# Patient Record
Sex: MALE | Race: WHITE | NOT HISPANIC OR LATINO | Employment: OTHER | URBAN - METROPOLITAN AREA
[De-identification: names, ages, dates, MRNs, and addresses within clinical notes are randomized per-mention and may not be internally consistent; named-entity substitution may affect disease eponyms.]

---

## 2018-01-01 ENCOUNTER — APPOINTMENT (OUTPATIENT)
Dept: PREADMISSION TESTING | Facility: HOSPITAL | Age: 68
End: 2018-01-01

## 2018-01-01 ENCOUNTER — HOSPITAL ENCOUNTER (OUTPATIENT)
Facility: HOSPITAL | Age: 68
Setting detail: HOSPITAL OUTPATIENT SURGERY
Discharge: HOME | End: 2018-06-27
Attending: INTERNAL MEDICINE | Admitting: INTERNAL MEDICINE

## 2018-01-01 ENCOUNTER — APPOINTMENT (OUTPATIENT)
Dept: CARDIOLOGY | Facility: HOSPITAL | Age: 68
End: 2018-01-01
Attending: INTERNAL MEDICINE

## 2018-01-01 ENCOUNTER — PREP FOR SURGERY (OUTPATIENT)
Dept: OTHER | Facility: HOSPITAL | Age: 68
End: 2018-01-01

## 2018-01-01 ENCOUNTER — HOSPITAL ENCOUNTER (OUTPATIENT)
Facility: HOSPITAL | Age: 68
Discharge: HOME | End: 2018-08-22
Attending: INTERNAL MEDICINE | Admitting: INTERNAL MEDICINE

## 2018-01-01 ENCOUNTER — DOCUMENTATION (OUTPATIENT)
Dept: CARDIOLOGY | Facility: CLINIC | Age: 68
End: 2018-01-01

## 2018-01-01 ENCOUNTER — HOSPITAL ENCOUNTER (OUTPATIENT)
Dept: CARDIOLOGY | Facility: HOSPITAL | Age: 68
Discharge: HOME | End: 2018-05-02
Attending: INTERNAL MEDICINE | Admitting: INTERNAL MEDICINE

## 2018-01-01 ENCOUNTER — ANESTHESIA EVENT (OUTPATIENT)
Dept: CARDIOLOGY | Facility: HOSPITAL | Age: 68
End: 2018-01-01

## 2018-01-01 ENCOUNTER — ANESTHESIA (OUTPATIENT)
Dept: CARDIOLOGY | Facility: HOSPITAL | Age: 68
End: 2018-01-01

## 2018-01-01 ENCOUNTER — TELEPHONE (OUTPATIENT)
Dept: CARDIOLOGY | Facility: CLINIC | Age: 68
End: 2018-01-01

## 2018-01-01 ENCOUNTER — APPOINTMENT (OUTPATIENT)
Dept: GENERAL RADIOLOGY | Facility: HOSPITAL | Age: 68
End: 2018-01-01

## 2018-01-01 ENCOUNTER — OFFICE VISIT (OUTPATIENT)
Dept: CARDIOLOGY | Facility: CLINIC | Age: 68
End: 2018-01-01

## 2018-01-01 ENCOUNTER — HOSPITAL ENCOUNTER (INPATIENT)
Facility: HOSPITAL | Age: 68
LOS: 2 days | Discharge: HOME | End: 2018-06-14
Attending: INTERNAL MEDICINE | Admitting: INTERNAL MEDICINE

## 2018-01-01 VITALS
HEIGHT: 71 IN | HEART RATE: 92 BPM | DIASTOLIC BLOOD PRESSURE: 68 MMHG | WEIGHT: 179.31 LBS | SYSTOLIC BLOOD PRESSURE: 116 MMHG | OXYGEN SATURATION: 94 % | RESPIRATION RATE: 16 BRPM | BODY MASS INDEX: 25.1 KG/M2

## 2018-01-01 VITALS
WEIGHT: 180.8 LBS | HEIGHT: 71 IN | SYSTOLIC BLOOD PRESSURE: 130 MMHG | BODY MASS INDEX: 25.31 KG/M2 | DIASTOLIC BLOOD PRESSURE: 74 MMHG | HEART RATE: 77 BPM

## 2018-01-01 VITALS
DIASTOLIC BLOOD PRESSURE: 64 MMHG | RESPIRATION RATE: 18 BRPM | HEART RATE: 65 BPM | TEMPERATURE: 97.5 F | SYSTOLIC BLOOD PRESSURE: 133 MMHG | WEIGHT: 175.49 LBS | OXYGEN SATURATION: 93 % | BODY MASS INDEX: 24.57 KG/M2 | HEIGHT: 71 IN

## 2018-01-01 VITALS
HEART RATE: 92 BPM | BODY MASS INDEX: 25.64 KG/M2 | DIASTOLIC BLOOD PRESSURE: 67 MMHG | OXYGEN SATURATION: 79 % | RESPIRATION RATE: 16 BRPM | HEIGHT: 71 IN | TEMPERATURE: 97.9 F | WEIGHT: 183.13 LBS | SYSTOLIC BLOOD PRESSURE: 110 MMHG

## 2018-01-01 VITALS
DIASTOLIC BLOOD PRESSURE: 70 MMHG | OXYGEN SATURATION: 97 % | TEMPERATURE: 97.9 F | WEIGHT: 181.44 LBS | RESPIRATION RATE: 14 BRPM | SYSTOLIC BLOOD PRESSURE: 110 MMHG | BODY MASS INDEX: 25.4 KG/M2 | HEART RATE: 101 BPM | HEIGHT: 71 IN

## 2018-01-01 DIAGNOSIS — I48.19 PERSISTENT ATRIAL FIBRILLATION (HCC): Primary | ICD-10-CM

## 2018-01-01 DIAGNOSIS — I48.91 ATRIAL FIBRILLATION, UNSPECIFIED TYPE (HCC): ICD-10-CM

## 2018-01-01 DIAGNOSIS — I48.91 ATRIAL FIBRILLATION, UNSPECIFIED TYPE (HCC): Primary | ICD-10-CM

## 2018-01-01 DIAGNOSIS — I10 ESSENTIAL HYPERTENSION: Primary | ICD-10-CM

## 2018-01-01 DIAGNOSIS — I48.19 PERSISTENT ATRIAL FIBRILLATION (HCC): ICD-10-CM

## 2018-01-01 DIAGNOSIS — I48.91 A-FIB (HCC): ICD-10-CM

## 2018-01-01 DIAGNOSIS — I48.91 A-FIB (HCC): Primary | ICD-10-CM

## 2018-01-01 LAB
ANION GAP SERPL CALCULATED.3IONS-SCNC: 5 MMOL/L (ref 3–11)
ANION GAP SERPL CALCULATED.3IONS-SCNC: 6 MMOL/L (ref 3–11)
ANION GAP SERPL CALCULATED.3IONS-SCNC: 7 MMOL/L (ref 3–11)
ANION GAP SERPL CALCULATED.3IONS-SCNC: 8 MMOL/L (ref 3–11)
APTT PPP: 32.9 SECONDS (ref 24–31)
BUN BLD-MCNC: 12 MG/DL (ref 9–23)
BUN BLD-MCNC: 13 MG/DL (ref 9–23)
BUN BLD-MCNC: 18 MG/DL (ref 9–23)
BUN/CREAT SERPL: 14.3 (ref 7–25)
BUN/CREAT SERPL: 14.9 (ref 7–25)
BUN/CREAT SERPL: 15 (ref 7–25)
BUN/CREAT SERPL: 16.4 (ref 7–25)
BUN/CREAT SERPL: 17.9 (ref 7–25)
BUN/CREAT SERPL: 20.5 (ref 7–25)
CA-I SERPL ISE-MCNC: 1.33 MMOL/L (ref 1.12–1.32)
CALCIUM SPEC-SCNC: 8.7 MG/DL (ref 8.7–10.4)
CALCIUM SPEC-SCNC: 9.1 MG/DL (ref 8.7–10.4)
CALCIUM SPEC-SCNC: 9.3 MG/DL (ref 8.7–10.4)
CALCIUM SPEC-SCNC: 9.4 MG/DL (ref 8.7–10.4)
CALCIUM SPEC-SCNC: 9.5 MG/DL (ref 8.7–10.4)
CALCIUM SPEC-SCNC: 9.6 MG/DL (ref 8.7–10.4)
CHLORIDE SERPL-SCNC: 100 MMOL/L (ref 99–109)
CHLORIDE SERPL-SCNC: 103 MMOL/L (ref 99–109)
CO2 SERPL-SCNC: 31 MMOL/L (ref 20–31)
CO2 SERPL-SCNC: 32 MMOL/L (ref 20–31)
CO2 SERPL-SCNC: 32 MMOL/L (ref 20–31)
CO2 SERPL-SCNC: 33 MMOL/L (ref 20–31)
CO2 SERPL-SCNC: 33 MMOL/L (ref 20–31)
CO2 SERPL-SCNC: 34 MMOL/L (ref 20–31)
CREAT BLD-MCNC: 0.67 MG/DL (ref 0.6–1.3)
CREAT BLD-MCNC: 0.73 MG/DL (ref 0.6–1.3)
CREAT BLD-MCNC: 0.8 MG/DL (ref 0.6–1.3)
CREAT BLD-MCNC: 0.84 MG/DL (ref 0.6–1.3)
CREAT BLD-MCNC: 0.87 MG/DL (ref 0.6–1.3)
CREAT BLD-MCNC: 0.88 MG/DL (ref 0.6–1.3)
DEPRECATED RDW RBC AUTO: 45.8 FL (ref 37–54)
DEPRECATED RDW RBC AUTO: 49.2 FL (ref 37–54)
DEPRECATED RDW RBC AUTO: 49.6 FL (ref 37–54)
ERYTHROCYTE [DISTWIDTH] IN BLOOD BY AUTOMATED COUNT: 14.5 % (ref 11.3–14.5)
ERYTHROCYTE [DISTWIDTH] IN BLOOD BY AUTOMATED COUNT: 15.6 % (ref 11.3–14.5)
ERYTHROCYTE [DISTWIDTH] IN BLOOD BY AUTOMATED COUNT: 16.3 % (ref 11.3–14.5)
GFR SERPL CREATININE-BSD FRML MDRD: 107 ML/MIN/1.73
GFR SERPL CREATININE-BSD FRML MDRD: 118 ML/MIN/1.73
GFR SERPL CREATININE-BSD FRML MDRD: 86 ML/MIN/1.73
GFR SERPL CREATININE-BSD FRML MDRD: 87 ML/MIN/1.73
GFR SERPL CREATININE-BSD FRML MDRD: 91 ML/MIN/1.73
GFR SERPL CREATININE-BSD FRML MDRD: 96 ML/MIN/1.73
GLUCOSE BLD-MCNC: 114 MG/DL (ref 70–100)
GLUCOSE BLD-MCNC: 114 MG/DL (ref 70–100)
GLUCOSE BLD-MCNC: 119 MG/DL (ref 70–100)
GLUCOSE BLD-MCNC: 131 MG/DL (ref 70–100)
GLUCOSE BLD-MCNC: 134 MG/DL (ref 70–100)
GLUCOSE BLD-MCNC: 149 MG/DL (ref 70–100)
GLUCOSE BLDC GLUCOMTR-MCNC: 111 MG/DL (ref 70–130)
GLUCOSE BLDC GLUCOMTR-MCNC: 133 MG/DL (ref 70–130)
GLUCOSE BLDC GLUCOMTR-MCNC: 158 MG/DL (ref 70–130)
GLUCOSE BLDC GLUCOMTR-MCNC: 206 MG/DL (ref 70–130)
HBA1C MFR BLD: 6.7 % (ref 4.8–5.6)
HBA1C MFR BLD: 6.9 % (ref 4.8–5.6)
HCT VFR BLD AUTO: 37.5 % (ref 38.9–50.9)
HCT VFR BLD AUTO: 37.7 % (ref 38.9–50.9)
HCT VFR BLD AUTO: 38.4 % (ref 38.9–50.9)
HGB BLD-MCNC: 11.2 G/DL (ref 13.1–17.5)
HGB BLD-MCNC: 11.6 G/DL (ref 13.1–17.5)
HGB BLD-MCNC: 12.3 G/DL (ref 13.1–17.5)
INR PPP: 1.17 (ref 0.91–1.09)
INR PPP: 1.25 (ref 0.91–1.09)
INR PPP: 1.25 (ref 0.91–1.09)
MAGNESIUM SERPL-MCNC: 1.7 MG/DL (ref 1.3–2.7)
MAGNESIUM SERPL-MCNC: 1.8 MG/DL (ref 1.3–2.7)
MAGNESIUM SERPL-MCNC: 2.1 MG/DL (ref 1.3–2.7)
MAGNESIUM SERPL-MCNC: 2.3 MG/DL (ref 1.3–2.7)
MAGNESIUM SERPL-MCNC: 2.8 MG/DL (ref 1.3–2.7)
MCH RBC QN AUTO: 24.9 PG (ref 27–31)
MCH RBC QN AUTO: 26.5 PG (ref 27–31)
MCH RBC QN AUTO: 27.8 PG (ref 27–31)
MCHC RBC AUTO-ENTMCNC: 29.9 G/DL (ref 32–36)
MCHC RBC AUTO-ENTMCNC: 30.8 G/DL (ref 32–36)
MCHC RBC AUTO-ENTMCNC: 32 G/DL (ref 32–36)
MCV RBC AUTO: 83.3 FL (ref 80–99)
MCV RBC AUTO: 86.3 FL (ref 80–99)
MCV RBC AUTO: 86.9 FL (ref 80–99)
PLATELET # BLD AUTO: 169 10*3/MM3 (ref 150–450)
PLATELET # BLD AUTO: 189 10*3/MM3 (ref 150–450)
PLATELET # BLD AUTO: 208 10*3/MM3 (ref 150–450)
PMV BLD AUTO: 8.9 FL (ref 6–12)
PMV BLD AUTO: 9.1 FL (ref 6–12)
PMV BLD AUTO: 9.1 FL (ref 6–12)
POTASSIUM BLD-SCNC: 3.8 MMOL/L (ref 3.5–5.5)
POTASSIUM BLD-SCNC: 4.1 MMOL/L (ref 3.5–5.5)
POTASSIUM BLD-SCNC: 4.2 MMOL/L (ref 3.5–5.5)
POTASSIUM BLD-SCNC: 4.3 MMOL/L (ref 3.5–5.5)
POTASSIUM BLD-SCNC: 4.4 MMOL/L (ref 3.5–5.5)
POTASSIUM BLD-SCNC: 4.5 MMOL/L (ref 3.5–5.5)
PROTHROMBIN TIME: 12.3 SECONDS (ref 9.6–11.5)
PROTHROMBIN TIME: 13.1 SECONDS (ref 9.6–11.5)
PROTHROMBIN TIME: 13.1 SECONDS (ref 9.6–11.5)
RBC # BLD AUTO: 4.37 10*6/MM3 (ref 4.2–5.76)
RBC # BLD AUTO: 4.42 10*6/MM3 (ref 4.2–5.76)
RBC # BLD AUTO: 4.5 10*6/MM3 (ref 4.2–5.76)
SODIUM BLD-SCNC: 140 MMOL/L (ref 132–146)
SODIUM BLD-SCNC: 141 MMOL/L (ref 132–146)
SODIUM BLD-SCNC: 141 MMOL/L (ref 132–146)
SODIUM BLD-SCNC: 144 MMOL/L (ref 132–146)
WBC NRBC COR # BLD: 8.01 10*3/MM3 (ref 3.5–10.8)
WBC NRBC COR # BLD: 9.37 10*3/MM3 (ref 3.5–10.8)
WBC NRBC COR # BLD: 9.49 10*3/MM3 (ref 3.5–10.8)

## 2018-01-01 PROCEDURE — 83036 HEMOGLOBIN GLYCOSYLATED A1C: CPT | Performed by: NURSE PRACTITIONER

## 2018-01-01 PROCEDURE — A9270 NON-COVERED ITEM OR SERVICE: HCPCS | Performed by: NURSE ANESTHETIST, CERTIFIED REGISTERED

## 2018-01-01 PROCEDURE — 94640 AIRWAY INHALATION TREATMENT: CPT

## 2018-01-01 PROCEDURE — 93650 ICAR CATH ABLTJ AV NODE FUNC: CPT | Performed by: INTERNAL MEDICINE

## 2018-01-01 PROCEDURE — 93005 ELECTROCARDIOGRAM TRACING: CPT | Performed by: INTERNAL MEDICINE

## 2018-01-01 PROCEDURE — 92960 CARDIOVERSION ELECTRIC EXT: CPT

## 2018-01-01 PROCEDURE — C1887 CATHETER, GUIDING: HCPCS | Performed by: INTERNAL MEDICINE

## 2018-01-01 PROCEDURE — 80048 BASIC METABOLIC PNL TOTAL CA: CPT | Performed by: NURSE PRACTITIONER

## 2018-01-01 PROCEDURE — A9270 NON-COVERED ITEM OR SERVICE: HCPCS | Performed by: NURSE PRACTITIONER

## 2018-01-01 PROCEDURE — C2621 PMKR, OTHER THAN SING/DUAL: HCPCS | Performed by: INTERNAL MEDICINE

## 2018-01-01 PROCEDURE — 63710000001 BUDESONIDE-FORMOTEROL 160-4.5 MCG/ACT AEROSOL 6 G INHALER: Performed by: NURSE PRACTITIONER

## 2018-01-01 PROCEDURE — 25010000002 MIDAZOLAM PER 1 MG: Performed by: INTERNAL MEDICINE

## 2018-01-01 PROCEDURE — 25010000003 CEFAZOLIN IN DEXTROSE 2-4 GM/100ML-% SOLUTION: Performed by: NURSE PRACTITIONER

## 2018-01-01 PROCEDURE — 99024 POSTOP FOLLOW-UP VISIT: CPT | Performed by: INTERNAL MEDICINE

## 2018-01-01 PROCEDURE — C1733 CATH, EP, OTHR THAN COOL-TIP: HCPCS | Performed by: INTERNAL MEDICINE

## 2018-01-01 PROCEDURE — 63710000001 PANTOPRAZOLE 40 MG TABLET DELAYED-RELEASE: Performed by: NURSE PRACTITIONER

## 2018-01-01 PROCEDURE — 5A2204Z RESTORATION OF CARDIAC RHYTHM, SINGLE: ICD-10-PCS | Performed by: INTERNAL MEDICINE

## 2018-01-01 PROCEDURE — 63710000001 ALBUTEROL 108 (90 BASE) MCG/ACT AEROSOL SOLUTION 8 G INHALER: Performed by: NURSE ANESTHETIST, CERTIFIED REGISTERED

## 2018-01-01 PROCEDURE — 94799 UNLISTED PULMONARY SVC/PX: CPT

## 2018-01-01 PROCEDURE — 92960 CARDIOVERSION ELECTRIC EXT: CPT | Performed by: INTERNAL MEDICINE

## 2018-01-01 PROCEDURE — C1900 LEAD, CORONARY VENOUS: HCPCS | Performed by: INTERNAL MEDICINE

## 2018-01-01 PROCEDURE — 94760 N-INVAS EAR/PLS OXIMETRY 1: CPT

## 2018-01-01 PROCEDURE — 33249 INSJ/RPLCMT DEFIB W/LEAD(S): CPT | Performed by: INTERNAL MEDICINE

## 2018-01-01 PROCEDURE — A9270 NON-COVERED ITEM OR SERVICE: HCPCS | Performed by: INTERNAL MEDICINE

## 2018-01-01 PROCEDURE — 63710000001 ASPIRIN 81 MG TABLET DELAYED-RELEASE: Performed by: NURSE PRACTITIONER

## 2018-01-01 PROCEDURE — 63710000001 ATORVASTATIN 10 MG TABLET: Performed by: NURSE PRACTITIONER

## 2018-01-01 PROCEDURE — 25010000002 PHENYLEPHRINE PER 1 ML: Performed by: NURSE ANESTHETIST, CERTIFIED REGISTERED

## 2018-01-01 PROCEDURE — 83735 ASSAY OF MAGNESIUM: CPT

## 2018-01-01 PROCEDURE — 99024 POSTOP FOLLOW-UP VISIT: CPT | Performed by: NURSE PRACTITIONER

## 2018-01-01 PROCEDURE — C1892 INTRO/SHEATH,FIXED,PEEL-AWAY: HCPCS | Performed by: INTERNAL MEDICINE

## 2018-01-01 PROCEDURE — 93010 ELECTROCARDIOGRAM REPORT: CPT | Performed by: INTERNAL MEDICINE

## 2018-01-01 PROCEDURE — 63710000001 TAMSULOSIN 0.4 MG CAPSULE: Performed by: NURSE PRACTITIONER

## 2018-01-01 PROCEDURE — C1769 GUIDE WIRE: HCPCS | Performed by: INTERNAL MEDICINE

## 2018-01-01 PROCEDURE — 25010000002 FENTANYL CITRATE (PF) 100 MCG/2ML SOLUTION: Performed by: INTERNAL MEDICINE

## 2018-01-01 PROCEDURE — 63710000001 LEVOTHYROXINE 75 MCG TABLET: Performed by: NURSE PRACTITIONER

## 2018-01-01 PROCEDURE — 93000 ELECTROCARDIOGRAM COMPLETE: CPT | Performed by: NURSE PRACTITIONER

## 2018-01-01 PROCEDURE — 36415 COLL VENOUS BLD VENIPUNCTURE: CPT

## 2018-01-01 PROCEDURE — 85027 COMPLETE CBC AUTOMATED: CPT | Performed by: NURSE PRACTITIONER

## 2018-01-01 PROCEDURE — 25010000002 ONDANSETRON PER 1 MG: Performed by: NURSE ANESTHETIST, CERTIFIED REGISTERED

## 2018-01-01 PROCEDURE — 25010000002 ONDANSETRON PER 1 MG: Performed by: INTERNAL MEDICINE

## 2018-01-01 PROCEDURE — 93005 ELECTROCARDIOGRAM TRACING: CPT | Performed by: NURSE PRACTITIONER

## 2018-01-01 PROCEDURE — 33208 INSRT HEART PM ATRIAL & VENT: CPT

## 2018-01-01 PROCEDURE — 63710000001 FINASTERIDE 5 MG TABLET: Performed by: NURSE PRACTITIONER

## 2018-01-01 PROCEDURE — 25010000002 DEXAMETHASONE PER 1 MG: Performed by: NURSE ANESTHETIST, CERTIFIED REGISTERED

## 2018-01-01 PROCEDURE — 82962 GLUCOSE BLOOD TEST: CPT

## 2018-01-01 PROCEDURE — 82330 ASSAY OF CALCIUM: CPT | Performed by: NURSE PRACTITIONER

## 2018-01-01 PROCEDURE — 25010000002 NEOSTIGMINE 10 MG/10ML SOLUTION: Performed by: NURSE ANESTHETIST, CERTIFIED REGISTERED

## 2018-01-01 PROCEDURE — 80048 BASIC METABOLIC PNL TOTAL CA: CPT

## 2018-01-01 PROCEDURE — 25010000003 CEFAZOLIN IN DEXTROSE 2-4 GM/100ML-% SOLUTION: Performed by: INTERNAL MEDICINE

## 2018-01-01 PROCEDURE — 33225 L VENTRIC PACING LEAD ADD-ON: CPT | Performed by: INTERNAL MEDICINE

## 2018-01-01 PROCEDURE — 83735 ASSAY OF MAGNESIUM: CPT | Performed by: NURSE PRACTITIONER

## 2018-01-01 PROCEDURE — 63710000001 OXYCODONE-ACETAMINOPHEN 5-325 MG TABLET: Performed by: INTERNAL MEDICINE

## 2018-01-01 PROCEDURE — C1898 LEAD, PMKR, OTHER THAN TRANS: HCPCS | Performed by: INTERNAL MEDICINE

## 2018-01-01 PROCEDURE — 85027 COMPLETE CBC AUTOMATED: CPT | Performed by: INTERNAL MEDICINE

## 2018-01-01 PROCEDURE — 85610 PROTHROMBIN TIME: CPT | Performed by: NURSE PRACTITIONER

## 2018-01-01 PROCEDURE — 71046 X-RAY EXAM CHEST 2 VIEWS: CPT

## 2018-01-01 PROCEDURE — C1730 CATH, EP, 19 OR FEW ELECT: HCPCS | Performed by: INTERNAL MEDICINE

## 2018-01-01 PROCEDURE — 80048 BASIC METABOLIC PNL TOTAL CA: CPT | Performed by: INTERNAL MEDICINE

## 2018-01-01 PROCEDURE — C1894 INTRO/SHEATH, NON-LASER: HCPCS | Performed by: INTERNAL MEDICINE

## 2018-01-01 PROCEDURE — 63710000001 INSULIN LISPRO (HUMAN) PER 5 UNITS: Performed by: NURSE ANESTHETIST, CERTIFIED REGISTERED

## 2018-01-01 PROCEDURE — 25010000002 PROPOFOL 10 MG/ML EMULSION: Performed by: NURSE ANESTHETIST, CERTIFIED REGISTERED

## 2018-01-01 PROCEDURE — 63710000001 FUROSEMIDE 20 MG TABLET: Performed by: NURSE PRACTITIONER

## 2018-01-01 PROCEDURE — 99232 SBSQ HOSP IP/OBS MODERATE 35: CPT | Performed by: INTERNAL MEDICINE

## 2018-01-01 PROCEDURE — 83735 ASSAY OF MAGNESIUM: CPT | Performed by: INTERNAL MEDICINE

## 2018-01-01 PROCEDURE — 99222 1ST HOSP IP/OBS MODERATE 55: CPT | Performed by: INTERNAL MEDICINE

## 2018-01-01 PROCEDURE — 85730 THROMBOPLASTIN TIME PARTIAL: CPT | Performed by: NURSE PRACTITIONER

## 2018-01-01 PROCEDURE — 99204 OFFICE O/P NEW MOD 45 MIN: CPT | Performed by: INTERNAL MEDICINE

## 2018-01-01 PROCEDURE — 0 IOPAMIDOL PER 1 ML: Performed by: INTERNAL MEDICINE

## 2018-01-01 PROCEDURE — 63710000001 MONTELUKAST 10 MG TABLET: Performed by: NURSE PRACTITIONER

## 2018-01-01 DEVICE — LD PM TENDRIL STS 6F52CM 2088TC52: Type: IMPLANTABLE DEVICE | Status: FUNCTIONAL

## 2018-01-01 DEVICE — LD QUARTET LV S/CRV BIPOL 1458Q/92: Type: IMPLANTABLE DEVICE | Status: FUNCTIONAL

## 2018-01-01 DEVICE — LD PM TENDRIL STS 6F58CM 2088TC58: Type: IMPLANTABLE DEVICE | Status: FUNCTIONAL

## 2018-01-01 DEVICE — GEN PM ALLURE QUADRA BIVENT RF CRTP PM3262: Type: IMPLANTABLE DEVICE | Status: FUNCTIONAL

## 2018-01-01 RX ORDER — FLUMAZENIL 0.1 MG/ML
INJECTION INTRAVENOUS
Status: DISCONTINUED
Start: 2018-01-01 | End: 2018-01-01 | Stop reason: WASHOUT

## 2018-01-01 RX ORDER — LEVOTHYROXINE SODIUM 0.05 MG/1
50 TABLET ORAL DAILY
COMMUNITY
End: 2018-01-01

## 2018-01-01 RX ORDER — ACETAMINOPHEN 325 MG/1
650 TABLET ORAL EVERY 4 HOURS PRN
Status: DISCONTINUED | OUTPATIENT
Start: 2018-01-01 | End: 2018-01-01 | Stop reason: HOSPADM

## 2018-01-01 RX ORDER — ACETAMINOPHEN 325 MG/1
650 TABLET ORAL EVERY 4 HOURS PRN
Status: CANCELLED | OUTPATIENT
Start: 2018-01-01

## 2018-01-01 RX ORDER — MIDAZOLAM HYDROCHLORIDE 1 MG/ML
INJECTION INTRAMUSCULAR; INTRAVENOUS
Status: DISCONTINUED
Start: 2018-01-01 | End: 2018-01-01 | Stop reason: HOSPADM

## 2018-01-01 RX ORDER — PROMETHAZINE HYDROCHLORIDE 25 MG/ML
12.5 INJECTION, SOLUTION INTRAMUSCULAR; INTRAVENOUS EVERY 4 HOURS PRN
Status: CANCELLED | OUTPATIENT
Start: 2018-01-01

## 2018-01-01 RX ORDER — DIGOXIN 250 MCG
250 TABLET ORAL
COMMUNITY
End: 2018-01-01 | Stop reason: HOSPADM

## 2018-01-01 RX ORDER — PANTOPRAZOLE SODIUM 40 MG/1
40 TABLET, DELAYED RELEASE ORAL EVERY MORNING
Status: DISCONTINUED | OUTPATIENT
Start: 2018-01-01 | End: 2018-01-01 | Stop reason: HOSPADM

## 2018-01-01 RX ORDER — ASPIRIN 81 MG/1
81 TABLET ORAL NIGHTLY
Status: DISCONTINUED | OUTPATIENT
Start: 2018-01-01 | End: 2018-01-01 | Stop reason: HOSPADM

## 2018-01-01 RX ORDER — FENTANYL CITRATE 50 UG/ML
INJECTION, SOLUTION INTRAMUSCULAR; INTRAVENOUS
Status: COMPLETED | OUTPATIENT
Start: 2018-01-01 | End: 2018-01-01

## 2018-01-01 RX ORDER — LEVOTHYROXINE SODIUM 0.05 MG/1
50 TABLET ORAL
Status: DISCONTINUED | OUTPATIENT
Start: 2018-01-01 | End: 2018-01-01 | Stop reason: HOSPADM

## 2018-01-01 RX ORDER — MONTELUKAST SODIUM 10 MG/1
10 TABLET ORAL NIGHTLY
Status: DISCONTINUED | OUTPATIENT
Start: 2018-01-01 | End: 2018-01-01 | Stop reason: HOSPADM

## 2018-01-01 RX ORDER — TAMSULOSIN HYDROCHLORIDE 0.4 MG/1
0.8 CAPSULE ORAL NIGHTLY
Status: DISCONTINUED | OUTPATIENT
Start: 2018-01-01 | End: 2018-01-01 | Stop reason: HOSPADM

## 2018-01-01 RX ORDER — TAMSULOSIN HYDROCHLORIDE 0.4 MG/1
2 CAPSULE ORAL DAILY
COMMUNITY

## 2018-01-01 RX ORDER — NITROGLYCERIN 0.4 MG/1
0.4 TABLET SUBLINGUAL
Status: CANCELLED | OUTPATIENT
Start: 2018-01-01

## 2018-01-01 RX ORDER — BUDESONIDE AND FORMOTEROL FUMARATE DIHYDRATE 160; 4.5 UG/1; UG/1
2 AEROSOL RESPIRATORY (INHALATION)
Status: DISCONTINUED | OUTPATIENT
Start: 2018-01-01 | End: 2018-01-01 | Stop reason: HOSPADM

## 2018-01-01 RX ORDER — ONDANSETRON 2 MG/ML
INJECTION INTRAMUSCULAR; INTRAVENOUS AS NEEDED
Status: DISCONTINUED | OUTPATIENT
Start: 2018-01-01 | End: 2018-01-01 | Stop reason: HOSPADM

## 2018-01-01 RX ORDER — FENTANYL CITRATE 50 UG/ML
INJECTION, SOLUTION INTRAMUSCULAR; INTRAVENOUS
Status: DISCONTINUED
Start: 2018-01-01 | End: 2018-01-01 | Stop reason: HOSPADM

## 2018-01-01 RX ORDER — FUROSEMIDE 20 MG/1
20 TABLET ORAL DAILY
Status: DISCONTINUED | OUTPATIENT
Start: 2018-01-01 | End: 2018-01-01 | Stop reason: HOSPADM

## 2018-01-01 RX ORDER — OXYCODONE HYDROCHLORIDE AND ACETAMINOPHEN 5; 325 MG/1; MG/1
1 TABLET ORAL EVERY 4 HOURS PRN
Status: DISCONTINUED | OUTPATIENT
Start: 2018-01-01 | End: 2018-01-01 | Stop reason: HOSPADM

## 2018-01-01 RX ORDER — SODIUM CHLORIDE 9 MG/ML
INJECTION, SOLUTION INTRAVENOUS CONTINUOUS PRN
Status: COMPLETED | OUTPATIENT
Start: 2018-01-01 | End: 2018-01-01

## 2018-01-01 RX ORDER — NITROGLYCERIN 0.4 MG/1
0.4 TABLET SUBLINGUAL
COMMUNITY
End: 2018-01-01

## 2018-01-01 RX ORDER — GLYCOPYRROLATE 0.2 MG/ML
INJECTION INTRAMUSCULAR; INTRAVENOUS AS NEEDED
Status: DISCONTINUED | OUTPATIENT
Start: 2018-01-01 | End: 2018-01-01 | Stop reason: SURG

## 2018-01-01 RX ORDER — CEFAZOLIN SODIUM 2 G/100ML
2 INJECTION, SOLUTION INTRAVENOUS
Status: CANCELLED | OUTPATIENT
Start: 2018-01-01 | End: 2018-01-01

## 2018-01-01 RX ORDER — DOFETILIDE 0.5 MG/1
500 CAPSULE ORAL ONCE
Status: COMPLETED | OUTPATIENT
Start: 2018-01-01 | End: 2018-01-01

## 2018-01-01 RX ORDER — ONDANSETRON 2 MG/ML
INJECTION INTRAMUSCULAR; INTRAVENOUS AS NEEDED
Status: DISCONTINUED | OUTPATIENT
Start: 2018-01-01 | End: 2018-01-01 | Stop reason: SURG

## 2018-01-01 RX ORDER — SOTALOL HYDROCHLORIDE 80 MG/1
80 TABLET ORAL 2 TIMES DAILY
COMMUNITY
End: 2018-01-01 | Stop reason: HOSPADM

## 2018-01-01 RX ORDER — POTASSIUM CHLORIDE 20 MEQ/1
20 TABLET, EXTENDED RELEASE ORAL DAILY
COMMUNITY

## 2018-01-01 RX ORDER — NEOSTIGMINE METHYLSULFATE 1 MG/ML
INJECTION, SOLUTION INTRAVENOUS AS NEEDED
Status: DISCONTINUED | OUTPATIENT
Start: 2018-01-01 | End: 2018-01-01 | Stop reason: SURG

## 2018-01-01 RX ORDER — DOFETILIDE 0.5 MG/1
500 CAPSULE ORAL EVERY 12 HOURS SCHEDULED
Qty: 60 CAPSULE | Refills: 2 | Status: CANCELLED | OUTPATIENT
Start: 2018-01-01

## 2018-01-01 RX ORDER — PROMETHAZINE HYDROCHLORIDE 25 MG/ML
12.5 INJECTION, SOLUTION INTRAMUSCULAR; INTRAVENOUS EVERY 4 HOURS PRN
Status: DISCONTINUED | OUTPATIENT
Start: 2018-01-01 | End: 2018-01-01 | Stop reason: HOSPADM

## 2018-01-01 RX ORDER — ASPIRIN 81 MG/1
81 TABLET ORAL NIGHTLY
COMMUNITY

## 2018-01-01 RX ORDER — POTASSIUM CHLORIDE 1.5 G/1.77G
20 POWDER, FOR SOLUTION ORAL DAILY
Status: DISCONTINUED | OUTPATIENT
Start: 2018-01-01 | End: 2018-01-01 | Stop reason: HOSPADM

## 2018-01-01 RX ORDER — SIMVASTATIN 20 MG
20 TABLET ORAL NIGHTLY
COMMUNITY

## 2018-01-01 RX ORDER — LOSARTAN POTASSIUM 25 MG/1
25 TABLET ORAL DAILY
COMMUNITY
End: 2018-01-01 | Stop reason: ALTCHOICE

## 2018-01-01 RX ORDER — OMEPRAZOLE 20 MG/1
20 CAPSULE, DELAYED RELEASE ORAL DAILY
COMMUNITY

## 2018-01-01 RX ORDER — POTASSIUM CHLORIDE 1.5 G/1.77G
20 POWDER, FOR SOLUTION ORAL ONCE
Status: COMPLETED | OUTPATIENT
Start: 2018-01-01 | End: 2018-01-01

## 2018-01-01 RX ORDER — POTASSIUM CHLORIDE 750 MG/1
40 CAPSULE, EXTENDED RELEASE ORAL AS NEEDED
Status: DISCONTINUED | OUTPATIENT
Start: 2018-01-01 | End: 2018-01-01 | Stop reason: HOSPADM

## 2018-01-01 RX ORDER — NITROGLYCERIN 0.4 MG/1
0.4 TABLET SUBLINGUAL
Status: DISCONTINUED | OUTPATIENT
Start: 2018-01-01 | End: 2018-01-01 | Stop reason: HOSPADM

## 2018-01-01 RX ORDER — FUROSEMIDE 20 MG/1
20 TABLET ORAL DAILY
COMMUNITY

## 2018-01-01 RX ORDER — DOFETILIDE 0.5 MG/1
500 CAPSULE ORAL EVERY 12 HOURS SCHEDULED
Status: DISCONTINUED | OUTPATIENT
Start: 2018-01-01 | End: 2018-01-01 | Stop reason: HOSPADM

## 2018-01-01 RX ORDER — MAGNESIUM SULFATE HEPTAHYDRATE 40 MG/ML
2 INJECTION, SOLUTION INTRAVENOUS AS NEEDED
Status: DISCONTINUED | OUTPATIENT
Start: 2018-01-01 | End: 2018-01-01 | Stop reason: HOSPADM

## 2018-01-01 RX ORDER — DEXTROSE MONOHYDRATE 25 G/50ML
25 INJECTION, SOLUTION INTRAVENOUS
Status: DISCONTINUED | OUTPATIENT
Start: 2018-01-01 | End: 2018-01-01 | Stop reason: HOSPADM

## 2018-01-01 RX ORDER — ALBUTEROL SULFATE 90 UG/1
AEROSOL, METERED RESPIRATORY (INHALATION) AS NEEDED
Status: DISCONTINUED | OUTPATIENT
Start: 2018-01-01 | End: 2018-01-01 | Stop reason: SURG

## 2018-01-01 RX ORDER — POTASSIUM CHLORIDE 1.5 G/1.77G
40 POWDER, FOR SOLUTION ORAL AS NEEDED
Status: DISCONTINUED | OUTPATIENT
Start: 2018-01-01 | End: 2018-01-01 | Stop reason: HOSPADM

## 2018-01-01 RX ORDER — ATRACURIUM BESYLATE 10 MG/ML
INJECTION, SOLUTION INTRAVENOUS AS NEEDED
Status: DISCONTINUED | OUTPATIENT
Start: 2018-01-01 | End: 2018-01-01 | Stop reason: SURG

## 2018-01-01 RX ORDER — DOFETILIDE 0.5 MG/1
500 CAPSULE ORAL EVERY 12 HOURS SCHEDULED
Qty: 60 CAPSULE | Refills: 2 | Status: SHIPPED | OUTPATIENT
Start: 2018-01-01 | End: 2018-01-01 | Stop reason: HOSPADM

## 2018-01-01 RX ORDER — LANSOPRAZOLE
30 KIT
Status: DISCONTINUED | OUTPATIENT
Start: 2018-01-01 | End: 2018-01-01 | Stop reason: HOSPADM

## 2018-01-01 RX ORDER — FENTANYL CITRATE 50 UG/ML
INJECTION, SOLUTION INTRAMUSCULAR; INTRAVENOUS
Status: DISCONTINUED
Start: 2018-01-01 | End: 2018-01-01 | Stop reason: WASHOUT

## 2018-01-01 RX ORDER — CEFAZOLIN SODIUM 2 G/100ML
2 INJECTION, SOLUTION INTRAVENOUS
Status: COMPLETED | OUTPATIENT
Start: 2018-01-01 | End: 2018-01-01

## 2018-01-01 RX ORDER — SODIUM CHLORIDE 0.9 % (FLUSH) 0.9 %
1-10 SYRINGE (ML) INJECTION AS NEEDED
Status: DISCONTINUED | OUTPATIENT
Start: 2018-01-01 | End: 2018-01-01 | Stop reason: HOSPADM

## 2018-01-01 RX ORDER — PROPOFOL 10 MG/ML
VIAL (ML) INTRAVENOUS AS NEEDED
Status: DISCONTINUED | OUTPATIENT
Start: 2018-01-01 | End: 2018-01-01 | Stop reason: SURG

## 2018-01-01 RX ORDER — MULTIVIT WITH MINERALS/LUTEIN
1000 TABLET ORAL DAILY
COMMUNITY

## 2018-01-01 RX ORDER — LEVOTHYROXINE SODIUM 0.07 MG/1
75 TABLET ORAL DAILY
COMMUNITY

## 2018-01-01 RX ORDER — NALOXONE HYDROCHLORIDE 0.4 MG/ML
INJECTION, SOLUTION INTRAMUSCULAR; INTRAVENOUS; SUBCUTANEOUS
Status: DISCONTINUED
Start: 2018-01-01 | End: 2018-01-01 | Stop reason: WASHOUT

## 2018-01-01 RX ORDER — ROFLUMILAST 500 UG/1
500 TABLET ORAL DAILY
COMMUNITY

## 2018-01-01 RX ORDER — ATORVASTATIN CALCIUM 10 MG/1
10 TABLET, FILM COATED ORAL NIGHTLY
Status: DISCONTINUED | OUTPATIENT
Start: 2018-01-01 | End: 2018-01-01 | Stop reason: HOSPADM

## 2018-01-01 RX ORDER — FINASTERIDE 5 MG/1
5 TABLET, FILM COATED ORAL DAILY
Status: DISCONTINUED | OUTPATIENT
Start: 2018-01-01 | End: 2018-01-01 | Stop reason: HOSPADM

## 2018-01-01 RX ORDER — SODIUM CHLORIDE 0.9 % (FLUSH) 0.9 %
1-10 SYRINGE (ML) INJECTION AS NEEDED
Status: CANCELLED | OUTPATIENT
Start: 2018-01-01

## 2018-01-01 RX ORDER — LEVOTHYROXINE SODIUM 0.07 MG/1
75 TABLET ORAL DAILY
Status: DISCONTINUED | OUTPATIENT
Start: 2018-01-01 | End: 2018-01-01 | Stop reason: HOSPADM

## 2018-01-01 RX ORDER — SODIUM CHLORIDE 9 MG/ML
1 INJECTION, SOLUTION INTRAVENOUS CONTINUOUS
Status: ACTIVE | OUTPATIENT
Start: 2018-01-01 | End: 2018-01-01

## 2018-01-01 RX ORDER — NICOTINE POLACRILEX 4 MG
15 LOZENGE BUCCAL
Status: DISCONTINUED | OUTPATIENT
Start: 2018-01-01 | End: 2018-01-01 | Stop reason: HOSPADM

## 2018-01-01 RX ORDER — MIDAZOLAM HYDROCHLORIDE 1 MG/ML
INJECTION INTRAMUSCULAR; INTRAVENOUS
Status: COMPLETED | OUTPATIENT
Start: 2018-01-01 | End: 2018-01-01

## 2018-01-01 RX ORDER — NITROGLYCERIN 400 UG/1
1 SPRAY ORAL
COMMUNITY

## 2018-01-01 RX ORDER — ROFLUMILAST 500 UG/1
500 TABLET ORAL DAILY
Status: DISCONTINUED | OUTPATIENT
Start: 2018-01-01 | End: 2018-01-01 | Stop reason: HOSPADM

## 2018-01-01 RX ORDER — ASPIRIN 81 MG/1
81 TABLET ORAL DAILY
Status: DISCONTINUED | OUTPATIENT
Start: 2018-01-01 | End: 2018-01-01 | Stop reason: HOSPADM

## 2018-01-01 RX ORDER — MONTELUKAST SODIUM 10 MG/1
10 TABLET ORAL NIGHTLY
COMMUNITY

## 2018-01-01 RX ORDER — MIDAZOLAM HYDROCHLORIDE 1 MG/ML
INJECTION INTRAMUSCULAR; INTRAVENOUS AS NEEDED
Status: DISCONTINUED | OUTPATIENT
Start: 2018-01-01 | End: 2018-01-01 | Stop reason: HOSPADM

## 2018-01-01 RX ORDER — ONDANSETRON 2 MG/ML
4 INJECTION INTRAMUSCULAR; INTRAVENOUS EVERY 6 HOURS PRN
Status: DISCONTINUED | OUTPATIENT
Start: 2018-01-01 | End: 2018-01-01 | Stop reason: HOSPADM

## 2018-01-01 RX ORDER — LIDOCAINE HYDROCHLORIDE 10 MG/ML
INJECTION, SOLUTION EPIDURAL; INFILTRATION; INTRACAUDAL; PERINEURAL AS NEEDED
Status: DISCONTINUED | OUTPATIENT
Start: 2018-01-01 | End: 2018-01-01 | Stop reason: SURG

## 2018-01-01 RX ORDER — ONDANSETRON 2 MG/ML
4 INJECTION INTRAMUSCULAR; INTRAVENOUS ONCE AS NEEDED
Status: DISCONTINUED | OUTPATIENT
Start: 2018-01-01 | End: 2018-01-01 | Stop reason: HOSPADM

## 2018-01-01 RX ORDER — DEXAMETHASONE SODIUM PHOSPHATE 4 MG/ML
INJECTION, SOLUTION INTRA-ARTICULAR; INTRALESIONAL; INTRAMUSCULAR; INTRAVENOUS; SOFT TISSUE AS NEEDED
Status: DISCONTINUED | OUTPATIENT
Start: 2018-01-01 | End: 2018-01-01 | Stop reason: SURG

## 2018-01-01 RX ORDER — FINASTERIDE 5 MG/1
5 TABLET, FILM COATED ORAL DAILY
COMMUNITY

## 2018-01-01 RX ORDER — CEFAZOLIN SODIUM 2 G/100ML
2 INJECTION, SOLUTION INTRAVENOUS EVERY 8 HOURS
Status: COMPLETED | OUTPATIENT
Start: 2018-01-01 | End: 2018-01-01

## 2018-01-01 RX ORDER — MAGNESIUM SULFATE HEPTAHYDRATE 40 MG/ML
4 INJECTION, SOLUTION INTRAVENOUS AS NEEDED
Status: DISCONTINUED | OUTPATIENT
Start: 2018-01-01 | End: 2018-01-01 | Stop reason: HOSPADM

## 2018-01-01 RX ORDER — NITROGLYCERIN 400 UG/1
1 SPRAY ORAL
COMMUNITY
End: 2018-01-01 | Stop reason: HOSPADM

## 2018-01-01 RX ORDER — FENTANYL CITRATE 50 UG/ML
INJECTION, SOLUTION INTRAMUSCULAR; INTRAVENOUS AS NEEDED
Status: DISCONTINUED | OUTPATIENT
Start: 2018-01-01 | End: 2018-01-01 | Stop reason: HOSPADM

## 2018-01-01 RX ORDER — NITROGLYCERIN 40 MG/1
1 PATCH TRANSDERMAL DAILY
COMMUNITY

## 2018-01-01 RX ORDER — NITROGLYCERIN 40 MG/1
1 PATCH TRANSDERMAL DAILY
Status: DISCONTINUED | OUTPATIENT
Start: 2018-01-01 | End: 2018-01-01 | Stop reason: HOSPADM

## 2018-01-01 RX ORDER — SODIUM CHLORIDE, SODIUM LACTATE, POTASSIUM CHLORIDE, CALCIUM CHLORIDE 600; 310; 30; 20 MG/100ML; MG/100ML; MG/100ML; MG/100ML
INJECTION, SOLUTION INTRAVENOUS CONTINUOUS PRN
Status: DISCONTINUED | OUTPATIENT
Start: 2018-01-01 | End: 2018-01-01 | Stop reason: SURG

## 2018-01-01 RX ADMIN — ATRACURIUM BESYLATE 50 MG: 10 INJECTION, SOLUTION INTRAVENOUS at 09:25

## 2018-01-01 RX ADMIN — IPRATROPIUM BROMIDE 0.5 MG: 0.5 SOLUTION RESPIRATORY (INHALATION) at 12:53

## 2018-01-01 RX ADMIN — DOFETILIDE 500 MCG: 0.5 CAPSULE ORAL at 00:45

## 2018-01-01 RX ADMIN — MIDAZOLAM HYDROCHLORIDE 1 MG: 1 INJECTION, SOLUTION INTRAMUSCULAR; INTRAVENOUS at 10:38

## 2018-01-01 RX ADMIN — ASPIRIN 81 MG: 81 TABLET, COATED ORAL at 08:26

## 2018-01-01 RX ADMIN — DOFETILIDE 500 MCG: 0.5 CAPSULE ORAL at 13:58

## 2018-01-01 RX ADMIN — ROFLUMILAST 500 MCG: 500 TABLET ORAL at 08:35

## 2018-01-01 RX ADMIN — METHOHEXITAL SODIUM 20 MG: 500 INJECTION, POWDER, LYOPHILIZED, FOR SOLUTION INTRAMUSCULAR; INTRAVENOUS; RECTAL at 10:38

## 2018-01-01 RX ADMIN — METOPROLOL TARTRATE 25 MG: 25 TABLET ORAL at 10:54

## 2018-01-01 RX ADMIN — ATORVASTATIN CALCIUM 10 MG: 10 TABLET, FILM COATED ORAL at 20:12

## 2018-01-01 RX ADMIN — IPRATROPIUM BROMIDE 0.5 MG: 0.5 SOLUTION RESPIRATORY (INHALATION) at 20:15

## 2018-01-01 RX ADMIN — FUROSEMIDE 20 MG: 20 TABLET ORAL at 10:06

## 2018-01-01 RX ADMIN — ONDANSETRON 4 MG: 2 INJECTION INTRAMUSCULAR; INTRAVENOUS at 11:21

## 2018-01-01 RX ADMIN — FINASTERIDE 5 MG: 5 TABLET, FILM COATED ORAL at 10:06

## 2018-01-01 RX ADMIN — LEVOTHYROXINE SODIUM 50 MCG: 50 TABLET ORAL at 05:47

## 2018-01-01 RX ADMIN — TAMSULOSIN HYDROCHLORIDE 0.8 MG: 0.4 CAPSULE ORAL at 21:43

## 2018-01-01 RX ADMIN — ALBUTEROL SULFATE 6 PUFF: 90 AEROSOL, METERED RESPIRATORY (INHALATION) at 09:27

## 2018-01-01 RX ADMIN — CEFAZOLIN SODIUM 2 G: 2 INJECTION, SOLUTION INTRAVENOUS at 01:58

## 2018-01-01 RX ADMIN — IPRATROPIUM BROMIDE 0.5 MG: 0.5 SOLUTION RESPIRATORY (INHALATION) at 16:26

## 2018-01-01 RX ADMIN — DOFETILIDE 500 MCG: 0.5 CAPSULE ORAL at 21:20

## 2018-01-01 RX ADMIN — IPRATROPIUM BROMIDE 0.5 MG: 0.5 SOLUTION RESPIRATORY (INHALATION) at 16:23

## 2018-01-01 RX ADMIN — GLYCOPYRROLATE 0.4 MG: 0.2 INJECTION, SOLUTION INTRAMUSCULAR; INTRAVENOUS at 11:21

## 2018-01-01 RX ADMIN — PHENYLEPHRINE HYDROCHLORIDE 160 MCG: 10 INJECTION INTRAVENOUS at 10:14

## 2018-01-01 RX ADMIN — METOPROLOL TARTRATE 25 MG: 25 TABLET ORAL at 08:32

## 2018-01-01 RX ADMIN — MIDAZOLAM HYDROCHLORIDE 1 MG: 1 INJECTION, SOLUTION INTRAMUSCULAR; INTRAVENOUS at 15:09

## 2018-01-01 RX ADMIN — MIDAZOLAM HYDROCHLORIDE 2 MG: 1 INJECTION, SOLUTION INTRAMUSCULAR; INTRAVENOUS at 15:03

## 2018-01-01 RX ADMIN — BUDESONIDE AND FORMOTEROL FUMARATE DIHYDRATE 2 PUFF: 160; 4.5 AEROSOL RESPIRATORY (INHALATION) at 08:11

## 2018-01-01 RX ADMIN — FUROSEMIDE 20 MG: 20 TABLET ORAL at 08:34

## 2018-01-01 RX ADMIN — ROFLUMILAST 500 MCG: 500 TABLET ORAL at 12:03

## 2018-01-01 RX ADMIN — RIVAROXABAN 20 MG: 20 TABLET, FILM COATED ORAL at 17:48

## 2018-01-01 RX ADMIN — SODIUM CHLORIDE 1 ML/KG/HR: 9 INJECTION, SOLUTION INTRAVENOUS at 07:56

## 2018-01-01 RX ADMIN — METHOHEXITAL SODIUM 10 MG: 500 INJECTION, POWDER, LYOPHILIZED, FOR SOLUTION INTRAMUSCULAR; INTRAVENOUS; RECTAL at 10:42

## 2018-01-01 RX ADMIN — OXYCODONE HYDROCHLORIDE AND ACETAMINOPHEN 1 TABLET: 5; 325 TABLET ORAL at 10:20

## 2018-01-01 RX ADMIN — BUDESONIDE AND FORMOTEROL FUMARATE DIHYDRATE 2 PUFF: 160; 4.5 AEROSOL RESPIRATORY (INHALATION) at 08:04

## 2018-01-01 RX ADMIN — ATRACURIUM BESYLATE 15 MG: 10 INJECTION, SOLUTION INTRAVENOUS at 10:40

## 2018-01-01 RX ADMIN — ASPIRIN 81 MG: 81 TABLET, COATED ORAL at 21:43

## 2018-01-01 RX ADMIN — IPRATROPIUM BROMIDE 0.5 MG: 0.5 SOLUTION RESPIRATORY (INHALATION) at 08:16

## 2018-01-01 RX ADMIN — NITROGLYCERIN 1 PATCH: 0.2 PATCH TRANSDERMAL at 08:27

## 2018-01-01 RX ADMIN — BUDESONIDE AND FORMOTEROL FUMARATE DIHYDRATE 2 PUFF: 160; 4.5 AEROSOL RESPIRATORY (INHALATION) at 13:04

## 2018-01-01 RX ADMIN — ROFLUMILAST 500 MCG: 500 TABLET ORAL at 08:27

## 2018-01-01 RX ADMIN — METOPROLOL TARTRATE 25 MG: 25 TABLET ORAL at 20:12

## 2018-01-01 RX ADMIN — POTASSIUM CHLORIDE 20 MEQ: 1.5 POWDER, FOR SOLUTION ORAL at 10:54

## 2018-01-01 RX ADMIN — FENTANYL CITRATE 50 MCG: 50 INJECTION, SOLUTION INTRAMUSCULAR; INTRAVENOUS at 15:04

## 2018-01-01 RX ADMIN — FUROSEMIDE 20 MG: 20 TABLET ORAL at 08:26

## 2018-01-01 RX ADMIN — TAMSULOSIN HYDROCHLORIDE 0.8 MG: 0.4 CAPSULE ORAL at 20:12

## 2018-01-01 RX ADMIN — METHOHEXITAL SODIUM 10 MG: 500 INJECTION, POWDER, LYOPHILIZED, FOR SOLUTION INTRAMUSCULAR; INTRAVENOUS; RECTAL at 10:39

## 2018-01-01 RX ADMIN — ATORVASTATIN CALCIUM 10 MG: 10 TABLET, FILM COATED ORAL at 20:06

## 2018-01-01 RX ADMIN — CEFAZOLIN SODIUM 2 G: 2 INJECTION, SOLUTION INTRAVENOUS at 17:28

## 2018-01-01 RX ADMIN — METOPROLOL TARTRATE 25 MG: 25 TABLET ORAL at 20:07

## 2018-01-01 RX ADMIN — IPRATROPIUM BROMIDE 0.5 MG: 0.5 SOLUTION RESPIRATORY (INHALATION) at 13:04

## 2018-01-01 RX ADMIN — BUDESONIDE AND FORMOTEROL FUMARATE DIHYDRATE 2 PUFF: 160; 4.5 AEROSOL RESPIRATORY (INHALATION) at 21:15

## 2018-01-01 RX ADMIN — FINASTERIDE 5 MG: 5 TABLET, FILM COATED ORAL at 08:26

## 2018-01-01 RX ADMIN — SODIUM CHLORIDE, POTASSIUM CHLORIDE, SODIUM LACTATE AND CALCIUM CHLORIDE: 600; 310; 30; 20 INJECTION, SOLUTION INTRAVENOUS at 08:49

## 2018-01-01 RX ADMIN — DEXAMETHASONE SODIUM PHOSPHATE 8 MG: 4 INJECTION, SOLUTION INTRAMUSCULAR; INTRAVENOUS at 09:32

## 2018-01-01 RX ADMIN — RIVAROXABAN 20 MG: 20 TABLET, FILM COATED ORAL at 18:17

## 2018-01-01 RX ADMIN — LEVOTHYROXINE SODIUM 75 MCG: 75 TABLET ORAL at 06:22

## 2018-01-01 RX ADMIN — LANSOPRAZOLE 30 MG: KIT at 05:47

## 2018-01-01 RX ADMIN — BUDESONIDE AND FORMOTEROL FUMARATE DIHYDRATE 2 PUFF: 160; 4.5 AEROSOL RESPIRATORY (INHALATION) at 20:15

## 2018-01-01 RX ADMIN — FINASTERIDE 5 MG: 5 TABLET, FILM COATED ORAL at 08:32

## 2018-01-01 RX ADMIN — LIDOCAINE HYDROCHLORIDE 50 MG: 10 INJECTION, SOLUTION EPIDURAL; INFILTRATION; INTRACAUDAL; PERINEURAL at 09:25

## 2018-01-01 RX ADMIN — TAMSULOSIN HYDROCHLORIDE 0.8 MG: 0.4 CAPSULE ORAL at 20:06

## 2018-01-01 RX ADMIN — IPRATROPIUM BROMIDE 0.5 MG: 0.5 SOLUTION RESPIRATORY (INHALATION) at 08:10

## 2018-01-01 RX ADMIN — MAGNESIUM SULFATE IN WATER 4 G: 40 INJECTION, SOLUTION INTRAVENOUS at 11:29

## 2018-01-01 RX ADMIN — MONTELUKAST SODIUM 10 MG: 10 TABLET, FILM COATED ORAL at 20:06

## 2018-01-01 RX ADMIN — ASPIRIN 81 MG: 81 TABLET, COATED ORAL at 10:54

## 2018-01-01 RX ADMIN — PROPOFOL 120 MG: 10 INJECTION, EMULSION INTRAVENOUS at 09:25

## 2018-01-01 RX ADMIN — DOFETILIDE 500 MCG: 0.5 CAPSULE ORAL at 09:56

## 2018-01-01 RX ADMIN — BUDESONIDE AND FORMOTEROL FUMARATE DIHYDRATE 2 PUFF: 160; 4.5 AEROSOL RESPIRATORY (INHALATION) at 20:44

## 2018-01-01 RX ADMIN — MONTELUKAST SODIUM 10 MG: 10 TABLET, FILM COATED ORAL at 21:43

## 2018-01-01 RX ADMIN — ASPIRIN 81 MG: 81 TABLET, COATED ORAL at 08:34

## 2018-01-01 RX ADMIN — CEFAZOLIN SODIUM 2 G: 2 INJECTION, SOLUTION INTRAVENOUS at 09:49

## 2018-01-01 RX ADMIN — PANTOPRAZOLE SODIUM 40 MG: 40 TABLET, DELAYED RELEASE ORAL at 06:22

## 2018-01-01 RX ADMIN — BUDESONIDE AND FORMOTEROL FUMARATE DIHYDRATE 2 PUFF: 160; 4.5 AEROSOL RESPIRATORY (INHALATION) at 08:15

## 2018-01-01 RX ADMIN — NITROGLYCERIN 1 PATCH: 0.2 PATCH TRANSDERMAL at 08:35

## 2018-01-01 RX ADMIN — ATORVASTATIN CALCIUM 10 MG: 10 TABLET, FILM COATED ORAL at 21:43

## 2018-01-01 RX ADMIN — IPRATROPIUM BROMIDE 0.5 MG: 0.5 SOLUTION RESPIRATORY (INHALATION) at 20:44

## 2018-01-01 RX ADMIN — LANSOPRAZOLE 30 MG: KIT at 05:15

## 2018-01-01 RX ADMIN — LEVOTHYROXINE SODIUM 50 MCG: 50 TABLET ORAL at 05:15

## 2018-01-01 RX ADMIN — MONTELUKAST SODIUM 10 MG: 10 TABLET, FILM COATED ORAL at 20:12

## 2018-01-01 RX ADMIN — METOPROLOL TARTRATE 25 MG: 25 TABLET ORAL at 08:26

## 2018-01-01 RX ADMIN — IPRATROPIUM BROMIDE 0.5 MG: 0.5 SOLUTION RESPIRATORY (INHALATION) at 12:17

## 2018-01-01 RX ADMIN — NEOSTIGMINE METHYLSULFATE 3.5 MG: 1 INJECTION, SOLUTION INTRAVENOUS at 11:21

## 2018-01-01 RX ADMIN — DOFETILIDE 500 MCG: 0.5 CAPSULE ORAL at 08:34

## 2018-05-21 PROBLEM — I10 HYPERTENSION: Status: ACTIVE | Noted: 2018-01-01

## 2018-05-21 PROBLEM — I48.91 ATRIAL FIBRILLATION (HCC): Status: ACTIVE | Noted: 2018-01-01

## 2018-05-21 PROBLEM — J44.9 COPD (CHRONIC OBSTRUCTIVE PULMONARY DISEASE) (HCC): Status: ACTIVE | Noted: 2018-01-01

## 2018-05-21 NOTE — PROGRESS NOTES
Cuate Saleh  1950  [phone]    05/21/2018    [facility] CARDIOLOGY    King Mallika Palmer MD  [address] [address]    REFERRING DOCTOR: Dr. Flores      Patient ID: Cuate Saleh is a 67 y.o. male.    Chief Complaint: Atrial fibrillation    Problem List:  1) Atrial fibrillation   A. CHADSVASC = 3, on Xarelto   B. On Lopressor, Digoxin    C. Failed Sotalol.   D. Diagnosed Jan. 2015, started on Sotalol 40 mg bid   E. Jan. 2018 recurrent atrial fibrillation, increased Sotalol to 80 mg bid with out improvement, discontinued on 5/2/18 after unsuccessful ECV  2) Hypertension  3) Hyperlipidemia  4) CAD   A. S/p STEMI, CABG x 5 1998   B. Nuclear stress, 8/15/17: No ischemia.    C. Echocardiogram April 2018- EF 50%, LA moderately dilated, RA mildl dilated, mild MR, mild TR  5) GERD  6) Black lung/coalworker's pneumoconiosis  7) BPH  8) Hypothyroidism, on supplementation   8) Surgical Hx:   A. CABG   B. Cataracts removed, Jan. 2018      Allergies:   No Known Allergies    Current Medications:    Current Outpatient Prescriptions:   •  ascorbic acid (VITAMIN C) 1000 MG tablet, Take 1,000 mg by mouth Daily., Disp: , Rfl:   •  aspirin 81 MG EC tablet, Take 81 mg by mouth Daily., Disp: , Rfl:   •  digoxin (LANOXIN) 250 MCG tablet, Take 250 mcg by mouth Daily., Disp: , Rfl:   •  finasteride (PROSCAR) 5 MG tablet, Take 5 mg by mouth Daily., Disp: , Rfl:   •  fluticasone-salmeterol (ADVAIR) 250-50 MCG/DOSE DISKUS, Inhale 2 (Two) Times a Day., Disp: , Rfl:   •  furosemide (LASIX) 20 MG tablet, Take 20 mg by mouth 2 (Two) Times a Day., Disp: , Rfl:   •  metoprolol tartrate (LOPRESSOR) 25 MG tablet, Take 25 mg by mouth 2 (Two) Times a Day., Disp: , Rfl:   •  nitroglycerin (NITRODUR) 0.2 MG/HR patch, Place 1 patch on the skin Daily., Disp: , Rfl:   •  omeprazole (priLOSEC) 20 MG capsule, Take 20 mg by mouth Daily., Disp: , Rfl:   •  potassium chloride (K-DUR,KLOR-CON) 20 MEQ CR  "tablet, Take 20 mEq by mouth Daily., Disp: , Rfl:   •  rivaroxaban (XARELTO) 20 MG tablet, Take 20 mg by mouth Daily., Disp: , Rfl:   •  simvastatin (ZOCOR) 20 MG tablet, Take 20 mg by mouth Every Night., Disp: , Rfl:   •  tamsulosin (FLOMAX) 0.4 MG capsule 24 hr capsule, Take 1 capsule by mouth 2 (Two) Times a Day., Disp: , Rfl:   •  tiotropium (SPIRIVA) 18 MCG per inhalation capsule, Place 1 capsule into inhaler and inhale Daily., Disp: , Rfl:   •  traMADol-acetaminophen (ULTRACET) 37.5-325 MG per tablet, Take 1 tablet by mouth Every 6 (Six) Hours As Needed for Moderate Pain ., Disp: , Rfl:   Synthroid 50 mcg once daily     History of Present Illness: Mr. Saleh is a 66 y/o male with the above noted past medical history who is seen today in consultation for atrial fibrillation at the request of Dr. Flores. He was diagnosed with atrial fibrillation in 2015 and was started on Sotalol at that time. He did well until Jan. 2018 at which time he was noted to have Afib at follow up appointment. His sotalol was increased to 80 mg bid at this time. He then was set up for ECV on 5/2/18 which was unsuccessful and Sotalol was then discontinued. He is only on Lopressor and Digoxin. He is always chronically short of breath and notes this has been worse over the last month. He reports increased fatigue over the last month. In January was in the hospital for a questionable \"bowel blockage\" but doing well now and was in SR at that time but in March 2018 Dr Flores saw him and back in Afib. Patient continues to be SOB but worse recently, 3-4 mths worse. Black Lung noted and found at the time of bilateral PNA many years prior but the SOB was never this bad. 3 weeks ago told the Black lung at worse on inhalers and at times neb at home. As needed O2 and sleeps with the O2 as well.     The following portions of the patient's history were reviewed and updated as appropriate: allergies, current medications, past family history, past " medical history, past social history, past surgical history and problem list.      Past Medical History:   Diagnosis Date   • Arrhythmia    • Black lung    • COPD (chronic obstructive pulmonary disease)    • Enlarged prostate    • Hyperlipidemia    • Hypertension        Past Surgical History:   Procedure Laterality Date   • CATARACT EXTRACTION Bilateral    • CORONARY ARTERY BYPASS GRAFT  1998       Social History     Social History   • Marital status:      Spouse name: N/A   • Number of children: N/A   • Years of education: N/A     Occupational History   • Not on file.     Social History Main Topics   • Smoking status: Former Smoker     Quit date: 1998   • Smokeless tobacco: Never Used   • Alcohol use No   • Drug use: No   • Sexual activity: Defer     Other Topics Concern   • Not on file     Social History Narrative   • No narrative on file         Family History   Problem Relation Age of Onset   • Stroke Mother    • Heart attack Father    • Hypothyroidism Brother    • ROSA ISELA disease Brother    • Lung cancer Brother        REVIEW OF SYSTEMS:   CONSTITUTIONAL: No weight loss, fever, chills, + fatigue.   HEENT: Eyes: No visual loss, blurred vision, double vision or yellow sclerae. Ears, Nose, Throat: No hearing loss, sneezing, congestion, runny nose or sore throat.   SKIN: No rash or itching.     RESPIRATORY: No hemoptysis, cough or sputum.  + SOB  GASTROINTESTINAL: No anorexia, nausea, vomiting or diarrhea. No abdominal pain, bright red blood per rectum or melena.  GENITOURINARY: No burning on urination, hematuria or increased frequency.  NEUROLOGICAL: No headache, dizziness, syncope, paralysis, ataxia, numbness or tingling in the extremities. No change in bowel or bladder control.   MUSCULOSKELETAL: No muscle, back pain, joint pain or stiffness.   HEMATOLOGIC: No anemia, bleeding or bruising.   LYMPHATICS: No enlarged nodes. No history of splenectomy.   PSYCHIATRIC: No history of depression, anxiety,  "hallucinations.   ENDOCRINOLOGIC: No reports of sweating, cold or heat intolerance. No polyuria or polydipsia.   Ext: No edema or bruising      The patient's old records including ambulatory rhythm recordings (ECGs, Holter/event monitor) were reviewed and discussed.           Objective:       Vitals:    05/21/18 0944   BP: 130/74   BP Location: Left arm   Patient Position: Sitting   Pulse: 77   Weight: 82 kg (180 lb 12.8 oz)   Height: 180.3 cm (71\")         Physical Exam:  Constitutional: well-developed and well-nourished. No distress.   HEENT: Normocephalic. Atraumatic. Conjunctivae are normal. No scleral icterus. Mucus membranes pink and moist.  Neck: Normal carotid pulses. No JVD present. Carotid bruit is not present. No thyromegaly present.   Cardiovascular: irreg irreg  S1 normal, S2 normal. No rubs, gallops. PMI is not displaced.  + RAHEEM  Pulses: Radial pulses are 2+ on the right side, and 2+ on the left side.    Dorsalis pedis pulses are 2+ on the right side, and 2+ on the left side.   Pulmonary: Non-labored. Clear to auscultation; no rales, wheezes or rhonchi.   Abdominal: Soft. Non tender. Non distended. Normoactive bowel sounds. No masses. No hepatosplenomegaly.   Musculoskeletal:  exhibits no edema, tenderness or deformity.   EXT: No swelling  Neurological: is alert and oriented to person, place, and time.   Skin: Skin is warm and dry. No rash noted. No diaphoretic. No cyanosis or erythema. No pallor. Nails show no clubbing.   Psychiatric: Normal mood and affect.Speech is normal and behavior is normal.    Lab Review:   Results for orders placed or performed during the hospital encounter of 05/02/18   CBC (No Diff)   Result Value Ref Range    WBC 9.49 3.50 - 10.80 10*3/mm3    RBC 4.42 4.20 - 5.76 10*6/mm3    Hemoglobin 12.3 (L) 13.1 - 17.5 g/dL    Hematocrit 38.4 (L) 38.9 - 50.9 %    MCV 86.9 80.0 - 99.0 fL    MCH 27.8 27.0 - 31.0 pg    MCHC 32.0 32.0 - 36.0 g/dL    RDW 14.5 11.3 - 14.5 %    RDW-SD 45.8 " 37.0 - 54.0 fl    MPV 8.9 6.0 - 12.0 fL    Platelets 169 150 - 450 10*3/mm3   Basic Metabolic Panel   Result Value Ref Range    Glucose 114 (H) 70 - 100 mg/dL    BUN 12 9 - 23 mg/dL    Creatinine 0.80 0.60 - 1.30 mg/dL    Sodium 141 132 - 146 mmol/L    Potassium 3.8 3.5 - 5.5 mmol/L    Chloride 100 99 - 109 mmol/L    CO2 34.0 (H) 20.0 - 31.0 mmol/L    Calcium 9.6 8.7 - 10.4 mg/dL    eGFR Non African Amer 96 >60 mL/min/1.73    BUN/Creatinine Ratio 15.0 7.0 - 25.0    Anion Gap 7.0 3.0 - 11.0 mmol/L         ECG 12 Lead  Date/Time: 5/21/2018 10:16 AM  Performed by: BLANCA TAYLOR  Authorized by: BLANCA TAYLOR   Comparison: compared with previous ECG from 5/2/2018  Rhythm: atrial fibrillation  BPM: 77                  Diagnosis:   1. Essential hypertension  2. Persistent atrial fibrillation  3. COPD, Black Lung  Assessment & Plan:   1. Persistent atrial fibrillation, failed Sotalol with recurrent symptomatic atrial fibrillation. He has had increased fatigue and SOB over the last month and worse than prior which was thought to be sec to the black lung prior. S/p unsuccessful ECV 5/2/18 at which time Sotalol was discontinued. ANticoagulated with Xarelto.   Discussed Tikosyn vs. PVA. Patient is interested in proceed with Tikosyn to see if this makes his symptoms better and then consider PVA in the future but higher risk with his history of Black Lung, COPD. Has been off sotalol for 3 weeks, continue on current Rx, Xarelto  2. Essential hypertension, controlled   Continue current medications    3. Black lung/coalworker's pneumoconiosis, chronic shortness of breath which has been worse over the last month. Unclear if this is s/t Afib vs. Black lung and most likely combination. In the long run if PVA is needed will have pulm weight risk and benefits since under general anesthesia.      4. CAD, s/p CABG 1998, low normal EF, normal stress Fall 2017, stable with mild VHD    5. Follow up after Tikosyn  admission    CC: Dr. Flores    Scribed for Faisal Foote DO by MAGGIE Toth, MANINDER. 5/21/2018  10:32 AM     I, Faisal Foote DO, personally performed the services described in this documentation as scribed by the above named individual in my presence, and it is both accurate and complete.  5/21/2018  10:36 AM    Faisal Foote DO  10:36 AM  05/21/18      EMR Dragon/Transcription disclaimer:  Much of this encounter note is an electronic transcription/translation of spoken language to printed text. Electronic translation of spoken language may permit erroneous, or at times, nonsensical words or phrases to be inadvertently transcribed. Although I have reviewed the note for such errors, some may still exist.

## 2018-06-11 NOTE — DISCHARGE INSTRUCTIONS
The following instructions were given to the patient during PAT visit:    Please report to registration at the arrival time told to you by your physician.    You may eat and drink as normal the day of your procedure.  Please take your morning dose of medications as usual.    If you did not bring your medications to your PAT visit, pleas bring with you to the hospital on the day of admission.    If applicable, please CPAP mask and tubing for your hospital stay.

## 2018-06-12 PROBLEM — I48.91 A-FIB (HCC): Status: ACTIVE | Noted: 2018-01-01

## 2018-06-12 NOTE — PROGRESS NOTES
Discharge Planning Assessment  [facility]     Patient Name: Cuate Saleh  MRN: [id number]  Today's Date: 6/12/2018    Admit Date: 6/12/2018          Discharge Needs Assessment     Row Name 06/12/18 1445       Living Environment    Lives With spouse;grandchild(tereso)    Name(s) of Who Lives With Patient Wife and 19 year old adopted grandsalvador    Current Living Arrangements home/apartment/condo    Primary Care Provided by self    Provides Primary Care For no one    Family Caregiver if Needed spouse;grandchild(tereso), adult    Quality of Family Relationships helpful;involved;supportive    Able to Return to Prior Arrangements yes       Resource/Environmental Concerns    Resource/Environmental Concerns none    Transportation Concerns car, none       Transition Planning    Patient/Family Anticipates Transition to home with family    Patient/Family Anticipated Services at Transition none    Transportation Anticipated car, drives self       Discharge Needs Assessment    Concerns to be Addressed medication    Equipment Currently Used at Home oxygen   Wears oxygen prn with [employer] in [facility]. [phone]. Family will bring portable to hospital for transport home.     Anticipated Changes Related to Illness none    Equipment Needed After Discharge none    Current Discharge Risk chronically ill            Discharge Plan     Row Name 06/12/18 1440       Plan    Plan Home with family    Patient/Family in Agreement with Plan yes    Plan Comments Met with pt at . He lives with his wife and adult grandsalvador in [place], [place]. He is independent of ADL's. He has been disabled since he was 48, receiving black lung benefits. He has Medicare A&B and [place] BC supplement. He is able to obtain his medications but reports his Xarelto is expensive at $100/month. He is here for Tikosyn initiation. Per his [facility] in [place], his generic Tikosyn does not need a prior auth and has a copay of $78 per month.  Discussed with pt and he is agreeable to this copay, even though it is a little expensive for him. Offered the Tikosyn assistance financial form and he states he makes too much to qualify for that. Called Rosi at 888-XARELTO to see if pt would qualify for assistance and she gave 2 numbers for financial assistance- Varun and Varun at [phone] or Medicare D Assistance at [phone]. Discussed with pt and provided numbers to him, he feels he makes too much money to qualify for financial assistance. He is agreeable to Meds to Beds and will get his first Tikosyn fill here at [facility]. His family will transport home. CM will cont to follow for any d/c needs.     Final Discharge Disposition Code 01 - home or self-care        Destination     No service coordination in this encounter.      Durable Medical Equipment     No service coordination in this encounter.      Dialysis/Infusion     No service coordination in this encounter.      Home Medical Care     No service coordination in this encounter.      Social Care     No service coordination in this encounter.                Demographic Summary     Row Name 06/12/18 6151       General Information    Referral Source admission list;physician    Reason for Consult other (see comments)   Tikosyn initiation    Preferred Language English    General Information Comments PCP is Carrie Palmer       Contact Information    Permission Granted to Share Info With             Functional Status     Row Name 06/12/18 3150       Functional Status    Usual Activity Tolerance moderate    Current Activity Tolerance fair       Functional Status, IADL    Medications independent    Meal Preparation independent    Housekeeping independent    Laundry independent    Shopping independent       Employment/    Employment Status disabled    Employment/ Comments Pt has Medicare A&B and [place] BC supplement. He has prescription coverage but unsure who it is with.              Psychosocial    No documentation.           Abuse/Neglect    No documentation.           Legal    No documentation.           Substance Abuse    No documentation.           Patient Forms    No documentation.         Jojo Buchanan

## 2018-06-12 NOTE — H&P
[facility]  Cardiovascular History and Physical      Cuate Saleh  N603/1  [id number]  1950    DATE OF ADMISSION: 6/12/2018  DATE OF CONSULTATION:  06/12/18    Carrie Palmer MD    Chief complaint/ Reason for Consultation: Atrial fibrillation/Tikosyn initiation    Problem List:  1) Atrial fibrillation              A. CHADSVASC = 3, on Xarelto              B. On Lopressor, Digoxin               C. Failed Sotalol.              D. Diagnosed Jan. 2015, started on Sotalol 40 mg bid              E. Jan. 2018 recurrent atrial fibrillation, increased Sotalol to 80 mg bid with out improvement, discontinued on 5/2/18 after unsuccessful ECV  2) Hypertension  3) Hyperlipidemia  4) CAD              A. S/p STEMI, CABG x 5 1998              B. Nuclear stress, 8/15/17: No ischemia.               C. Echocardiogram April 2018- EF 50%, LA moderately dilated, RA mildl dilated, mild MR, mild TR  5) GERD  6) Black lung/coalworker's pneumoconiosis  7) BPH  8) Hypothyroidism, on supplementation   8) Surgical Hx:              A. CABG              B. Cataracts removed, Jan. 2018    History of Present Illness: Mr. Saleh is a 68 y/o male with the above noted past medical history [facility]. He was diagnosed with atrial fibrillation in 2015 and was started on Sotalol at that time. He did well until Jan. 2018 at which time he was noted to have Afib at follow up appointment. His sotalol was increased to 80 mg bid at this time. He then was set up for ECV on 5/2/18 which was unsuccessful and Sotalol was then discontinued. He is only on Lopressor and Digoxin. He is always chronically short of breath and notes this has been worse over the last few months. He reports increased fatigue as well. Black Lung noted and found at the time of bilateral PNA many years prior but the SOB was never this bad. He therefore, was given the option of PVA vs. Tikosyn and patient  opted for Tikosyn initiation with plans for PVA in the future if this makes him feel better. He remains anticoagulated with Xarelto. No missed doses for > 30 days. Has been on Sotalol since 5/2/18.         Past Medical History:   Diagnosis Date   • Arrhythmia    • Arthritis    • Atrial fibrillation    • Black lung    • COPD (chronic obstructive pulmonary disease)    • Coronary artery disease    • Disease of thyroid gland    • Enlarged prostate    • GERD (gastroesophageal reflux disease)    • Heart attack    • Hyperlipidemia    • Hypertension    • On home oxygen therapy     2 liters as needed    • Prediabetes    • Wears dentures    • Wears glasses        Past Surgical History:   Procedure Laterality Date   • CARDIAC CATHETERIZATION     • CATARACT EXTRACTION Bilateral    • COLONOSCOPY     • CORONARY ARTERY BYPASS GRAFT  1998   • HAND SURGERY         No current facility-administered medications on file prior to encounter.      Current Outpatient Prescriptions on File Prior to Encounter   Medication Sig Dispense Refill   • ascorbic acid (VITAMIN C) 1000 MG tablet Take 1,000 mg by mouth Daily.     • aspirin 81 MG EC tablet Take 81 mg by mouth Daily.     • digoxin (LANOXIN) 250 MCG tablet Take 250 mcg by mouth Daily.     • finasteride (PROSCAR) 5 MG tablet Take 5 mg by mouth Daily.     • fluticasone-salmeterol (ADVAIR) 250-50 MCG/DOSE DISKUS Inhale 2 (Two) Times a Day.     • furosemide (LASIX) 20 MG tablet Take 20 mg by mouth Daily.     • levothyroxine (SYNTHROID, LEVOTHROID) 50 MCG tablet Take 50 mcg by mouth Daily.     • metoprolol tartrate (LOPRESSOR) 25 MG tablet Take 25 mg by mouth 2 (Two) Times a Day.     • montelukast (SINGULAIR) 10 MG tablet Take 10 mg by mouth Every Night.     • nitroglycerin (NITRODUR) 0.2 MG/HR patch Place 1 patch on the skin Daily.     • nitroglycerin (NITROLINGUAL) 0.4 MG/SPRAY spray Place 1 spray under the tongue Every 5 (Five) Minutes As Needed for Chest Pain.     • omeprazole (priLOSEC) 20  "MG capsule Take 20 mg by mouth Daily.     • potassium chloride (K-DUR,KLOR-CON) 20 MEQ CR tablet Take 20 mEq by mouth Daily.     • rivaroxaban (XARELTO) 20 MG tablet Take 20 mg by mouth Daily.     • roflumilast (DALIRESP) 500 MCG tablet tablet Take 500 mcg by mouth Daily.     • simvastatin (ZOCOR) 20 MG tablet Take 20 mg by mouth Every Night.     • tamsulosin (FLOMAX) 0.4 MG capsule 24 hr capsule Take 2 capsules by mouth Daily.     • tiotropium (SPIRIVA) 18 MCG per inhalation capsule Place 1 capsule into inhaler and inhale Daily.     • traMADol-acetaminophen (ULTRACET) 37.5-325 MG per tablet Take 1 tablet by mouth 2 (Two) Times a Day.         Social History     Social History   • Marital status:      Spouse name: N/A   • Number of children: N/A   • Years of education: N/A     Occupational History   • Not on file.     Social History Main Topics   • Smoking status: Former Smoker     Quit date: 1998   • Smokeless tobacco: Never Used   • Alcohol use No   • Drug use: No   • Sexual activity: Defer     Other Topics Concern   • Not on file     Social History Narrative   • No narrative on file       Family History   Problem Relation Age of Onset   • Stroke Mother    • Heart attack Father    • Hypothyroidism Brother    • ROSA ISELA disease Brother    • Lung cancer Brother        REVIEW OF SYSTEMS:   14 point ROS was performed and is Negative except as outlined in HPI     Objective:     Vitals:    06/12/18 0915   BP: 122/63   BP Location: Right arm   Patient Position: Sitting   Pulse: 74   Resp: 18   Temp: 97.6 °F (36.4 °C)   TempSrc: Oral   SpO2: 92%   Weight: 83.1 kg (183 lb 2 oz)   Height: 180.3 cm (71\")     Body mass index is 25.54 kg/m².  Flowsheet Rows      First Filed Value   Admission Height  180.3 cm (71\") Documented at 06/12/2018 0915   Admission Weight  83.1 kg (183 lb 2 oz) Documented at 06/12/2018 0915        No intake or output data in the 24 hours ending 06/12/18 0956    Physical Exam   Constitutional: " well-developed and well-nourished. No distress.   HENT: Normocephalic. Atraumatic.   Eyes: Conjunctivae are normal. No scleral icterus.   Neck: Supple. No JVD. No carotid bruits.   Cardiovascular: irreg irreg S1 and S2 normal. No rubs, murmurs or gallops.  Pulses:       Radial pulses are 2+ on the right side, and 2+ on the left side.       Dorsalis pedis pulses are 2+ on the right side, and 2+ on the left side.   Pulmonary/Chest: Non labored. CTA bilaterally.   Musculoskeletal:  exhibits no tenderness or deformity.   EXT: No swelling  Neurological: is alert and oriented to person, place, and time.   Skin: Skin is warm and dry. No rash noted. Non diaphoretic. No cyanosis or erythema. No pallor. Nails show no clubbing.   Psychiatric: Normal mood and affect. Speech is normal and behavior is normal.      Lab Review:                  Results from last 7 days  Lab Units 06/11/18  1205   SODIUM mmol/L 144   POTASSIUM mmol/L 4.4   CHLORIDE mmol/L 103   CO2 mmol/L 33.0*   BUN mg/dL 12   CREATININE mg/dL 0.84   GLUCOSE mg/dL 149*   CALCIUM mg/dL 9.4               Results from last 7 days  Lab Units 06/11/18  1205   INR  1.17*   APTT seconds 32.9*           Results from last 7 days  Lab Units 06/11/18  1205   MAGNESIUM mg/dL 1.8             I personally viewed and interpreted the patient's EKG/Telemetry data         EKG: Afib, 79 bpm, QTc 413 ms.  Telemetry: Afib, 70's    Assessment/Plan:   1) Persistent atrial fibrillation:   - Failed sotalol and ECV. Off Sotalol since 5/2/18  - Symptomatic with SOB and fatigue  - QTc 410-420 msec. CrCl 99 mL/min.   - Anticoagulated with Xarelto. No missed doses in more than 30 days.   - Will proceed with Tikosyn initiation at 500 mcg bid after magnesium supplemented. Pending repeat lab.    - EKG 3 hours after each dose.   - Pharmacy and case mgt consult  - ECV On Thurs if still in Afib    2) Essential hypertension, controlled   Continue current medications     3) Black lung/coalworker's  pneumoconiosis, chronic shortness of breath which has been worse over the last month. Unclear if this is s/t Afib vs. Black lung and most likely combination. In the long run if PVA is needed will have pulm weigh risk and benefits since under general anesthesia.     4) CAD, s/p CABG 1998, low normal EF, normal stress Fall 2017, stable with mild VHD             Thank you for allowing me to participate in the care of Cuate Saleh. Feel free to contact me directly with any further questions or concerns.      Scribed for Faisal oFote DO by MAGGIE Toth, APRN. 6/12/2018  9:56 AM     I, Faisal Fotoe, personally performed the services described in this documentation as scribed by the above named individual in my presence, and it is both accurate and complete.  6/12/2018  4:49 PM    Faisal Foote DO  4:49 PM  06/12/18

## 2018-06-12 NOTE — NURSING NOTE
Patient Name:  Cuate Saleh  :  1950  DOS:  18    Hospital Problem List     A-fib          Patient with history of persistent afib, CAD, HTN, black lung admitted for Tikosyn.  Education provided.  Education time 10 min.  Discussed role of Afib Clinic and when to call. Patient lives 3 hours away, so he will follow up with us PRN or at Dr. Mccray's discretion.           Atrial fibrillation / Atrial flutter:  Quality Measure    CHADS-VASc Score:3    Anticoagulation for CHADS-VASc >/=2    Yes      Statin Therapy (for patients with a history of CAD, CVA/TIA, PVA, DM)  Yes    Atrial fibrillation / Atrial flutter education:   Signs / symptoms, Medications management and adherance and Role of [facility] and when to call

## 2018-06-12 NOTE — PROGRESS NOTES
"Tikosyn Initiation - Pharmacy Evaluation    Name- Cuate Saleh  Age- 67 y.o.  Sex- male  HT - 180.3 cm (71\")  Wt - 83.1 kg (183 lb 2 oz)      Evaluation of Drug-Drug Interactions     Previous antiarrythmic medications D/C 'ed prior to admission      Amiodarone D/C 'ed >3 weeks   Sotalol D/C 'ed > 48hr   Class I antiarrythmic's (Disopyramide,Flecainide, Mexiletine, Propafenone) D/C 'ed >48hr      Proceed - Yes      Drug-Drug Interactions with admission regimen    The Following medications are contraindicated with Dofetilide and will be discontinued:  -none    The following medications are recognized to prolong QT interval, however the medication continue during initial Dofetilide dosing:    -Loop Diuretics (furosemide)    The following medications may increase Dofetilide serum concentrations and can be co-administered:  -none    Laboratory      Results from last 7 days     Lab Units 06/11/18  1205   SODIUM mmol/L 144   POTASSIUM mmol/L 4.4   CHLORIDE mmol/L 103   CO2 mmol/L 33.0*   BUN mg/dL 12   CREATININE mg/dL 0.84   GLUCOSE mg/dL 149*   CALCIUM mg/dL 9.4     Results from last 7 days     Lab Units 06/12/18  0956   MAGNESIUM mg/dL 1.7   Electrolyte replacement ordered:   Mg <2.0 - Yes     K <4.0  - Yes    Est CrCl =  99 ml/min  (calculated with Cockroft-Gault equation using actual body weight and serum creatinine for calculation)  (laboratory values from previous 24 hours can be used)      Initial Dose      QTc = 413     QTc </= 440 msec -  Yes   Proceed - Yes    Initial Dose:    Yes - CrCl >60      Dofetilide 500mcg po q12h  (dosed at 10 hours intervals until administration times between 7-9)    Thank you,  Helio Antony, [facility]  6/12/2018  11:27 AM    " No

## 2018-06-12 NOTE — PLAN OF CARE
Problem: Arrhythmia/Dysrhythmia (Symptomatic) (Adult)  Goal: Signs and Symptoms of Listed Potential Problems Will be Absent, Minimized or Managed (Arrhythmia/Dysrhythmia)  Outcome: Ongoing (interventions implemented as appropriate)   06/12/18 9255   Goal/Outcome Evaluation   Problems Assessed (Arrhythmia/Dysrhythmia) all   Problems Present (Dysrhythmia) hypoxia/hypoxemia;situational response

## 2018-06-13 NOTE — PLAN OF CARE
Problem: Patient Care Overview  Goal: Plan of Care Review  Outcome: Ongoing (interventions implemented as appropriate)   06/13/18 3599   Coping/Psychosocial   Plan of Care Reviewed With patient   Plan of Care Review   Progress no change   OTHER   Outcome Summary VSS. Afib on the monitor. Pt had 3rd dose of Tikosyn this am. NPO after MN for cardioversion in am. No other complains reported.

## 2018-06-13 NOTE — PROGRESS NOTES
[facility] at [facility]  Cardiovascular Consultation Note  Cuate Saleh  [id number]  1950    DATE OF ADMISSION: 6/12/2018  DATE OF FOLLOW UP:  6/13/2018    Carrie Palmer MD    Subjective:     Patient ID: Cuate Saleh is a 67 y.o. male.    Chief Complaint: Afib      No Known Allergies    Current Facility-Administered Medications:   •  aspirin EC tablet 81 mg, 81 mg, Oral, Daily, Jeanne B Chris Perez, APRN, 81 mg at 06/13/18 0826  •  atorvastatin (LIPITOR) tablet 10 mg, 10 mg, Oral, Nightly, Jeanne B Chris Perez, APRN, 10 mg at 06/12/18 2012  •  budesonide-formoterol (SYMBICORT) 160-4.5 MCG/ACT inhaler 2 puff, 2 puff, Inhalation, BID - RT, Jeanne PANCHITO Perez, APRN, 2 puff at 06/13/18 0811  •  dofetilide (TIKOSYN) capsule 500 mcg, 500 mcg, Oral, Once, Jeanne B Chris Perez, APRN  •  dofetilide (TIKOSYN) capsule 500 mcg, 500 mcg, Oral, Q12H, Jeanne B Chris Perez, APRN  •  finasteride (PROSCAR) tablet 5 mg, 5 mg, Oral, Daily, Jeanne B Chris Perez, APRN, 5 mg at 06/13/18 0826  •  furosemide (LASIX) tablet 20 mg, 20 mg, Oral, Daily, Jeanne B Chris Perez, APRN, 20 mg at 06/13/18 0826  •  ipratropium (ATROVENT) nebulizer solution 0.5 mg, 0.5 mg, Nebulization, 4x Daily - RT, Jeanne B Chris Perez, APRN, 0.5 mg at 06/13/18 0810  •  lansoprazole (FIRST) oral suspension 30 mg, 30 mg, Oral, Q AM, Jeanne B Chris Perez, APRN, 30 mg at 06/13/18 0547  •  levothyroxine (SYNTHROID, LEVOTHROID) tablet 50 mcg, 50 mcg, Oral, Q AM, Jeanne B Chris Perez, APRN, 50 mcg at 06/13/18 0547  •  Magnesium Sulfate 2 gram Bolus, followed by 8 gram infusion (total Mg dose 10 grams)- Mg less than or equal to 1mg/dL, 2 g, Intravenous, PRN **OR** Magnesium Sulfate 2 gram / 50mL Infusion (GIVE X 3 BAGS TO EQUAL 6GM TOTAL DOSE) - Mg 1.1 - 1/5 mg/dl, 2 g, Intravenous, PRN **OR** Magnesium Sulfate 4 gram infusion- Mg 1.6-1.9 mg/dL, 4 g, Intravenous, PRN, Helio Antony, [facility], Last Rate: 25 mL/hr at  06/12/18 1129, 4 g at 06/12/18 1129  •  metoprolol tartrate (LOPRESSOR) tablet 25 mg, 25 mg, Oral, Q12H, Jeanne B Chris Perez, APRN, 25 mg at 06/13/18 0826  •  montelukast (SINGULAIR) tablet 10 mg, 10 mg, Oral, Nightly, Jeanne B Hermess Perez, APRN, 10 mg at 06/12/18 2012  •  nitroglycerin (NITRODUR) 0.2 MG/HR patch 1 patch, 1 patch, Transdermal, Daily, Jeanne B Chris Perez, APRN, 1 patch at 06/13/18 0827  •  Pharmacy Consult, , Does not apply, Once PRN, Jeanne PANCHITO Del Castillokins, APRN  •  potassium chloride (KLOR-CON) packet 40 mEq, 40 mEq, Oral, PRN, Helio Antony, [facility]  •  potassium chloride (MICRO-K) CR capsule 40 mEq, 40 mEq, Oral, PRN, Helio Antony, [facility]  •  rivaroxaban (XARELTO) tablet 20 mg, 20 mg, Oral, Daily With Dinner, Jeanne B Hermess Perez, APRN, 20 mg at 06/12/18 1748  •  roflumilast (DALIRESP) tablet 500 mcg, 500 mcg, Oral, Daily, Jeanne B Hermess Perez, APRN, 500 mcg at 06/13/18 0827  •  tamsulosin (FLOMAX) 24 hr capsule 0.8 mg, 0.8 mg, Oral, Nightly, Jeanne B Hermess Perez, APRN, 0.8 mg at 06/12/18 2012  •  traMADol-acetaminophen (ULTRACET)  mg combo dose, , Oral, Q6H PRN, Jeanne B Hermess Perez, APRN    History of Present Illness  Patient doing okay today.  Still in atrial fibrillation at times with RVR.    The following portions of the patient's history were reviewed and updated as appropriate: allergies, current medications, past family history, past medical history, past social history, past surgical history and problem list.    ROS   14 point ROS negative except as outlined in problem list, HPI and other parts of the note.    Procedures       Objective:       Vitals:    06/13/18 0321 06/13/18 0810 06/13/18 0811 06/13/18 0826   BP: 114/64 116/73  116/73   BP Location: Left arm      Patient Position: Lying      Pulse: 75 85 100 106   Resp: 16  24    Temp: 98.3 °F (36.8 °C)      TempSrc: Oral      SpO2: 98%      Weight:       Height:           Intake/Output Summary (Last 24 hours) at  06/13/18 0840  Last data filed at 06/13/18 0527   Gross per 24 hour   Intake                0 ml   Output              200 ml   Net             -200 ml       GENERAL: Well-developed, well-nourished patient in no acute distress.  HEENT: Normocephalic, atraumatic, PERRLA. Moist mucous membranes.  NECK: No JVD present at 30°. No carotid bruits auscultated.  LUNGS: Clear to auscultation.  CARDIOVASCULAR: irreg irreg tachy No murmurs, gallops or rubs noted.   ABDOMEN: Soft, nontender. Positive bowel sounds.  MUSCULOSKELETAL: No gross deformities. No clubbing, cyanosis  EXT: pulses intact, no swelling  SKIN: Pink, warm  Neuro: Nonfocal exam. Gait intact    The patient's old records including ambulatory rhythm recordings (ECGs, Holter/event monitor) were reviewed and discussed.      Lab Review:     Results from last 7 days  Lab Units 06/13/18  0454 06/11/18  1205   SODIUM mmol/L 140 144   POTASSIUM mmol/L 4.5 4.4   CHLORIDE mmol/L 103 103   CO2 mmol/L 32.0* 33.0*   BUN mg/dL 12 12   CREATININE mg/dL 0.67 0.84   GLUCOSE mg/dL 119* 149*   CALCIUM mg/dL 8.7 9.4               Results from last 7 days  Lab Units 06/11/18  1205   INR  1.17*   APTT seconds 32.9*           Results from last 7 days  Lab Units 06/13/18  0454   MAGNESIUM mg/dL 2.3         EKG: Afib with QTc about 420 msec            Assessment & Plan:   1. Persistent symptomatic atrial fibrillation with failure of sotalol in the past.  Here for dofetilide loading.  Started on 500 µg twice a day.  Continuing on Lopressor as well as Xarelto.  QTc okay today.  We'll proceed with external cardioversion tomorrow stone atrial fibrillation.  Possible discharge as well tomorrow after fifth dose.  Long run if recurrent atrial fibrillation at that time consideration for pulmonary vein ablation versus pacemaker AV node depending on his black lung coworkers pneumoconiosis and pulmonary input.    Keep NPO after MN for ECV in am    2.  Coronary artery disease status post CABG.   Normal stress in the fall 2017.  No chest pain. EF 50%           Faisal Foote,   06/13/18  8:40 AM        EMR Dragon/Transcription disclaimer:  Much of this encounter note is an electronic transcription/translation of spoken language to printed text. Electronic translation of spoken language may permit erroneous, or at times, nonsensical words or phrases to be inadvertently transcribed. Although I have reviewed the note for such errors, some may still exist.

## 2018-06-13 NOTE — PLAN OF CARE
Problem: Patient Care Overview  Goal: Plan of Care Review  Outcome: Ongoing (interventions implemented as appropriate)   06/13/18 0658   Coping/Psychosocial   Plan of Care Reviewed With patient   Plan of Care Review   Progress no change   OTHER   Outcome Summary vss. no c/o pain or discomfort. 2nd dose of tikosyn given. QTc 400 this AM. still in afib. will continue to monitor.        Problem: Arrhythmia/Dysrhythmia (Symptomatic) (Adult)  Goal: Signs and Symptoms of Listed Potential Problems Will be Absent, Minimized or Managed (Arrhythmia/Dysrhythmia)  Outcome: Ongoing (interventions implemented as appropriate)   06/13/18 0658   Goal/Outcome Evaluation   Problems Assessed (Arrhythmia/Dysrhythmia) all   Problems Present (Dysrhythmia) electrophysiologic conduction defect

## 2018-06-14 NOTE — PROGRESS NOTES
[facility] at [facility]  Cardiovascular Consultation Note  Cuate Saleh  [id number]  1950    DATE OF ADMISSION: 6/12/2018  DATE OF FOLLOW UP:  6/13/2018    Carrie Palmer MD    Subjective:     Patient ID: Cuate Saleh is a 67 y.o. male.    Chief Complaint: Afib      No Known Allergies    Current Facility-Administered Medications:   •  aspirin EC tablet 81 mg, 81 mg, Oral, Daily, Jeanne B Hermess Perez, APRN, 81 mg at 06/13/18 0826  •  atorvastatin (LIPITOR) tablet 10 mg, 10 mg, Oral, Nightly, Jeanne B Kuns Perez, APRN, 10 mg at 06/13/18 2006  •  budesonide-formoterol (SYMBICORT) 160-4.5 MCG/ACT inhaler 2 puff, 2 puff, Inhalation, BID - RT, Jeanne B Hermess Perez, APRN, 2 puff at 06/14/18 0815  •  dofetilide (TIKOSYN) capsule 500 mcg, 500 mcg, Oral, Q12H, Jeanne B Kuns Perez, APRN, 500 mcg at 06/13/18 2120  •  finasteride (PROSCAR) tablet 5 mg, 5 mg, Oral, Daily, Jeanne B Hermess Perez, APRN, 5 mg at 06/13/18 0826  •  furosemide (LASIX) tablet 20 mg, 20 mg, Oral, Daily, Jeanne B Hermess Perez, APRN, 20 mg at 06/13/18 0826  •  ipratropium (ATROVENT) nebulizer solution 0.5 mg, 0.5 mg, Nebulization, 4x Daily - RT, Jeanne B Hermess Perez, APRN, 0.5 mg at 06/14/18 0816  •  lansoprazole (FIRST) oral suspension 30 mg, 30 mg, Oral, Q AM, Jeanne B Hermess Perez, APRN, 30 mg at 06/14/18 0515  •  levothyroxine (SYNTHROID, LEVOTHROID) tablet 50 mcg, 50 mcg, Oral, Q AM, Jeanne B Hermess Perez, APRN, 50 mcg at 06/14/18 0515  •  Magnesium Sulfate 2 gram Bolus, followed by 8 gram infusion (total Mg dose 10 grams)- Mg less than or equal to 1mg/dL, 2 g, Intravenous, PRN **OR** Magnesium Sulfate 2 gram / 50mL Infusion (GIVE X 3 BAGS TO EQUAL 6GM TOTAL DOSE) - Mg 1.1 - 1/5 mg/dl, 2 g, Intravenous, PRN **OR** Magnesium Sulfate 4 gram infusion- Mg 1.6-1.9 mg/dL, 4 g, Intravenous, PRN, Helio Antony, [facility], Last Rate: 25 mL/hr at 06/12/18 1129, 4 g at 06/12/18 1129  •  metoprolol tartrate  (LOPRESSOR) tablet 25 mg, 25 mg, Oral, Q12H, Jeanne B Hermess Perez, APRN, 25 mg at 06/13/18 2007  •  montelukast (SINGULAIR) tablet 10 mg, 10 mg, Oral, Nightly, Jeanne B Kuns Perez, APRN, 10 mg at 06/13/18 2006  •  nitroglycerin (NITRODUR) 0.2 MG/HR patch 1 patch, 1 patch, Transdermal, Daily, Jeanne B Hermess Perez, APRN, 1 patch at 06/13/18 0827  •  Pharmacy Consult, , Does not apply, Once PRN, Jeanne B Hermess Perez, APRN  •  potassium chloride (KLOR-CON) packet 40 mEq, 40 mEq, Oral, PRN, Helio Antony, [facility]  •  potassium chloride (MICRO-K) CR capsule 40 mEq, 40 mEq, Oral, PRN, Helio Antony, [facility]  •  rivaroxaban (XARELTO) tablet 20 mg, 20 mg, Oral, Daily With Dinner, Jeanne B Hermess Perez, APRN, 20 mg at 06/13/18 1817  •  roflumilast (DALIRESP) tablet 500 mcg, 500 mcg, Oral, Daily, Jeanne B Kuns Perez, APRN, 500 mcg at 06/13/18 0827  •  tamsulosin (FLOMAX) 24 hr capsule 0.8 mg, 0.8 mg, Oral, Nightly, Jeanne B Hermess Perez, APRN, 0.8 mg at 06/13/18 2006  •  traMADol-acetaminophen (ULTRACET)  mg combo dose, , Oral, Q6H PRN, Jeanne B Hermess Perez, APRN    History of Present Illness  Patient doing ok, no major issues  NPO for ECV today    The following portions of the patient's history were reviewed and updated as appropriate: allergies, current medications, past family history, past medical history, past social history, past surgical history and problem list.    ROS   14 point ROS negative except as outlined in problem list, HPI and other parts of the note.    Procedures       Objective:       Vitals:    06/13/18 2044 06/13/18 2120 06/14/18 0258 06/14/18 0816   BP:  128/87 133/75    BP Location:   Left arm    Patient Position:   Lying    Pulse: 93 90 77 110   Resp: 18 18 18   Temp:   98.5 °F (36.9 °C)    TempSrc:   Oral    SpO2: 95%  95% 96%   Weight:       Height:           Intake/Output Summary (Last 24 hours) at 06/14/18 0824  Last data filed at 06/13/18 1818   Gross per 24 hour   Intake               480 ml   Output             1475 ml   Net             -995 ml       GENERAL: Well-developed, well-nourished patient in no acute distress.  HEENT: Normocephalic, atraumatic, PERRLA. Moist mucous membranes.  NECK: No JVD present at 30°. No carotid bruits auscultated.  LUNGS: Clear to auscultation.  CARDIOVASCULAR: irreg irreg No murmurs, gallops or rubs noted.   ABDOMEN: Soft, nontender. Positive bowel sounds.  MUSCULOSKELETAL: No gross deformities. No clubbing, cyanosis  EXT: pulses intact, no swelling  SKIN: Pink, warm  Neuro: Nonfocal exam. Gait intact    The patient's old records including ambulatory rhythm recordings (ECGs, Holter/event monitor) were reviewed and discussed.      Lab Review:     Results from last 7 days  Lab Units 06/14/18  0455 06/13/18  0454 06/11/18  1205   SODIUM mmol/L 140 140 144   POTASSIUM mmol/L 4.1 4.5 4.4   CHLORIDE mmol/L 103 103 103   CO2 mmol/L 32.0* 32.0* 33.0*   BUN mg/dL 12 12 12   CREATININE mg/dL 0.73 0.67 0.84   GLUCOSE mg/dL 131* 119* 149*   CALCIUM mg/dL 9.5 8.7 9.4               Results from last 7 days  Lab Units 06/11/18  1205   INR  1.17*   APTT seconds 32.9*           Results from last 7 days  Lab Units 06/14/18  0455   MAGNESIUM mg/dL 2.1         TELE: Afib    EKG: Afib QTc ok            Assessment & Plan:   1. Persistent symptomatic atrial fibrillation with failure of sotalol in the past.  Here for dofetilide loading.  Started on 500 µg twice a day.  Continuing on Lopressor as well as Xarelto.  QTc okay today.  We'll proceed with external cardioversion today.   Long run if recurrent atrial fibrillation at that time consideration for pulmonary vein ablation versus pacemaker AV node depending on his black lung coworkers pneumoconiosis and pulmonary input.     Keep NPO for ECV today and possible DC if QTc ok this afternoon.      2.  Coronary artery disease status post CABG.  Normal stress in the fall 2017.  No chest pain. EF 50%           Faisal Foote  DO  06/14/18  8:24 AM        EMR Dragon/Transcription disclaimer:  Much of this encounter note is an electronic transcription/translation of spoken language to printed text. Electronic translation of spoken language may permit erroneous, or at times, nonsensical words or phrases to be inadvertently transcribed. Although I have reviewed the note for such errors, some may still exist.

## 2018-06-19 NOTE — DISCHARGE SUMMARY
Date of Discharge:  6/19/2018  Date of Admit: 6/12/2018    Carrie Palmer MD      Discharge Diagnosis:Active Problems:    A-fib    History of Present Illness: Mr. Saleh is a 68 y/o male with the above noted past medical history who presents toady for Tikosyn initiation. He was diagnosed with atrial fibrillation in 2015 and was started on Sotalol at that time. He did well until Jan. 2018 at which time he was noted to have Afib at follow up appointment. His sotalol was increased to 80 mg bid at this time. He then was set up for ECV on 5/2/18 which was unsuccessful and Sotalol was then discontinued. He is only on Lopressor and Digoxin. He is always chronically short of breath and notes this has been worse over the last few months. He reports increased fatigue as well. Black Lung noted and found at the time of bilateral PNA many years prior but the SOB was never this bad. He therefore, was given the option of PVA vs. Tikosyn and patient opted for Tikosyn initiation with plans for PVA in the future if this makes him feel better. He remains anticoagulated with Xarelto. No missed doses for > 30 days. Has been on Sotalol since 5/2/18.    Hospital Course: The patient was admitted to the telemetry floor for careful monitoring. On admission, the patient's  labs were reviewed, pharmacy was consulted and the patient's dose was calculated. Tikosyn was initiated and the QTc interval was followed closely.  After  4 doses the patient required cardioversion which was unsuccessful with ERAF. During admission the patient and family received education about Tikosyn and potential medication interactions and QT prolonging meds. With regards to anticoagulation pt has done well continuing on Tikosyn 500 mcg bid for now. Patient will be brought back in 2-4 weeks for external vs. internal cardioversion in the EP lab. At the time of discharge the patient is stable and appropriate for discharge to home.     Procedures Performed          Consults     No orders found from 5/14/2018 to 6/13/2018.        Discharge Physical Exam:  GENERAL: Well-developed, well-nourished patient in no acute distress.  HEENT: Normocephalic, atraumatic, PERRLA. Moist mucous membranes.  NECK: No JVD present at 30°. No carotid bruits auscultated.  LUNGS: Clear to auscultation.  CARDIOVASCULAR: irreg irreg No murmurs, gallops or rubs noted.   ABDOMEN: Soft, nontender. Positive bowel sounds.  MUSCULOSKELETAL: No gross deformities. No clubbing, cyanosis  EXT: pulses intact, no swelling  SKIN: Pink, warm  Neuro: Nonfocal exam. Gait intact    Discharge Medications     Discharge Medications      New Medications      Instructions Start Date   dofetilide 500 MCG capsule  Commonly known as:  TIKOSYN   500 mcg, Oral, Every 12 Hours Scheduled         Changes to Medications      Instructions Start Date   nitroglycerin 0.2 MG/HR patch  Commonly known as:  NITRODUR  What changed:  Another medication with the same name was removed. Continue taking this medication, and follow the directions you see here.   1 patch, Transdermal, Daily         Continue These Medications      Instructions Start Date   ascorbic acid 1000 MG tablet  Commonly known as:  VITAMIN C   1,000 mg, Oral, Daily      aspirin 81 MG EC tablet   81 mg, Oral, Daily      finasteride 5 MG tablet  Commonly known as:  PROSCAR   5 mg, Oral, Daily      fluticasone-salmeterol 250-50 MCG/DOSE DISKUS  Commonly known as:  ADVAIR   Inhalation, 2 Times Daily - RT      furosemide 20 MG tablet  Commonly known as:  LASIX   20 mg, Oral, Daily      levothyroxine 50 MCG tablet  Commonly known as:  SYNTHROID, LEVOTHROID   50 mcg, Oral, Daily      metoprolol tartrate 25 MG tablet  Commonly known as:  LOPRESSOR   25 mg, Oral, 2 Times Daily      montelukast 10 MG tablet  Commonly known as:  SINGULAIR   10 mg, Oral, Nightly      omeprazole 20 MG capsule  Commonly known as:  priLOSEC   20 mg, Oral, Daily      potassium chloride 20 MEQ CR  tablet  Commonly known as:  K-DUR,KLOR-CON   20 mEq, Oral, Daily      rivaroxaban 20 MG tablet  Commonly known as:  XARELTO   20 mg, Oral, Daily      roflumilast 500 MCG tablet tablet  Commonly known as:  DALIRESP   500 mcg, Oral, Daily      simvastatin 20 MG tablet  Commonly known as:  ZOCOR   20 mg, Oral, Nightly      tamsulosin 0.4 MG capsule 24 hr capsule  Commonly known as:  FLOMAX   2 capsules, Oral, Daily      tiotropium 18 MCG per inhalation capsule  Commonly known as:  SPIRIVA   1 capsule, Inhalation, Daily - RT      traMADol-acetaminophen 37.5-325 MG per tablet  Commonly known as:  ULTRACET   1 tablet, Oral, 2 Times Daily         Stop These Medications    digoxin 250 MCG tablet  Commonly known as:  LANOXIN            Discharge Diet: Cardiac    Activity at Discharge: Resume normal activity    Discharge disposition: home    Condition on Discharge: stable    Follow-up Appointments  Future Appointments  Date Time Provider Department Center   7/31/2018 11:20 AM MAHENDRA Modi E [facility] None     Additional Instructions for the Follow-ups that You Need to Schedule     Discharge Follow-up with Specialty: Faisal Foote in 6 weeks; 6 Weeks    As directed      Specialty:  Faisal Foote in 6 weeks    Follow Up:  6 Weeks               Test Results Pending at Discharge       MANINDER Hidalgo  06/19/18  2:25 PM    30 min spent in reviewing records, discussion and examination of the patient and discussion with other members of the patient's medical team.     I, Faisal Foote, have reviewed the note in full and agree with all aspects of the above including physical exam, assessment, labs and plan with changes made accordingly. Face to Face Time was spent with the patient.    Faisal Foote DO  06/19/18  2:40 PM

## 2018-06-20 PROBLEM — I48.19 PERSISTENT ATRIAL FIBRILLATION (HCC): Status: ACTIVE | Noted: 2018-01-01

## 2018-07-03 NOTE — TELEPHONE ENCOUNTER
Per SD pt needs PPM BIV possible A Lead  With AV node ablation with general anesthesia involvement. Orders placed. SD talked to daughter and pt.

## 2018-07-05 NOTE — TELEPHONE ENCOUNTER
I am ok with doing the Bi PPM, AV trinity ablation without seeing pulm or talking to them   It was the PVA that was concerning but no need to worry since no PVA    Would do this procedure with Gen Anesthesia involved but not actually intubate them but just have them help with case with his bad lung diasease    Dary

## 2018-07-05 NOTE — TELEPHONE ENCOUNTER
Patient's wife was notified and aware.I also clarified with Lacey in scheduling what anesthesia's role would be with the case.

## 2018-07-05 NOTE — TELEPHONE ENCOUNTER
The patient's wife called to let you know that her  does not see his pulmonologist, Dr. Kelly, until 7/13/18. She is currently on vacation and will not be back until 7/13/18. She didn't know if he needed to keep the scheduled appointment with Dr. Kelly or if you had already spoken with her. You had wanted to speak with her prior to doing the BIV PPM/AV node ablation. Dr. Kelly's office number is [phone] if you need it.

## 2018-08-20 NOTE — DISCHARGE INSTRUCTIONS

## 2018-08-21 NOTE — ANESTHESIA PREPROCEDURE EVALUATION
" Anesthesia Evaluation     Patient summary reviewed and Nursing notes reviewed   no history of anesthetic complications:  NPO Solid Status: > 8 hours  NPO Liquid Status: > 8 hours           Airway   Mallampati: II  TM distance: >3 FB  Neck ROM: full  No difficulty expected  Dental    (+) upper dentures and lower dentures    Pulmonary    (+) a smoker Former, COPD, shortness of breath, wheezes,     ROS comment: Black lung  Home O2 prn 2L  Cardiovascular   Exercise tolerance: poor (<4 METS)    ECG reviewed  Rhythm: irregular  Rate: abnormal    (+) hypertension, past MI , CAD, CABG >6 Months, dysrhythmias Atrial Fib, CHF, hyperlipidemia,   (-) angina    ROS comment: EF 50%    Neuro/Psych- negative ROS  GI/Hepatic/Renal/Endo    (+)  GERD well controlled,  diabetes mellitus (\"borderline\"), hypothyroidism,   (-) hepatitis, liver disease, no renal disease    Musculoskeletal     Abdominal    Substance History      OB/GYN          Other   (+) arthritis                   Anesthesia Plan    ASA 4   (MAC vs GA)  intravenous induction   Anesthetic plan and risks discussed with patient.    Plan discussed with CRNA.      "

## 2018-08-21 NOTE — ANESTHESIA PROCEDURE NOTES
Airway  Urgency: elective    Airway not difficult    General Information and Staff    Patient location during procedure: OR  CRNA: AMY SAMANIEGO    Indications and Patient Condition  Indications for airway management: airway protection    Preoxygenated: yes  MILS not maintained throughout  Mask difficulty assessment: 1 - vent by mask    Final Airway Details  Final airway type: endotracheal airway      Successful airway: ETT  Cuffed: yes   Successful intubation technique: direct laryngoscopy  Endotracheal tube insertion site: oral  Blade: Lois  Blade size: #3  ETT size: 7.0 mm  Cormack-Lehane Classification: grade I - full view of glottis  Placement verified by: chest auscultation and capnometry   Measured from: lips  ETT to lips (cm): 20  Number of attempts at approach: 1    Additional Comments  Negative epigastric sounds, Breath sound equal bilaterally with symmetric chest rise and fall. Easy atraumatic

## 2018-08-21 NOTE — ANESTHESIA POSTPROCEDURE EVALUATION
Patient: Cuate Saleh    Procedure Summary     Date:  08/21/18 Room / Location:  [facility]ATH[facility] E / [facility] INV[facility]IVE LOC[facility]    Anesthesia Start:  0849 Anesthesia Stop:      Procedures:       PPM BIV, possible a lead with AV node ablation with general anesthesia involvement. (N/A )      AV NODE-EP/Ablation (N/A ) Diagnosis:       Persistent atrial fibrillation (CMS/HCC)      (afib)    Provider:  Faisal Foote DO Provider:  Nat Bal MD    Anesthesia Type:  Not recorded ASA Status:  4          Anesthesia Type: No value filed.  Last vitals  /75    97.4   Pulse 81   Resp 18   SpO2 100     Post Anesthesia Care and Evaluation    Patient location during evaluation: PACU  Patient participation: complete - patient participated  Level of consciousness: awake and alert  Pain score: 0  Pain management: adequate  Airway patency: patent  Anesthetic complications: No anesthetic complications  PONV Status: none  Cardiovascular status: hemodynamically stable and acceptable  Respiratory status: nonlabored ventilation, acceptable and nasal cannula  Hydration status: acceptable

## 2018-08-30 NOTE — PROGRESS NOTES
WOUND CHECK    2018    Cuate Saleh, : 1950    WOUND CHECK    B/P:  (Sitting) 130/67 LEFT ARM  (Standing)    Pulse: 83    Patient has fever: [] Temperature if indicated: 97.6      Wound Location: LEFT INFRACLAVICULAR     Dressing Removed [x]        Old Dressing Appearance:  Clean, dry [x]                 Old, bloody drainage []                            Moist, serous drainage []                Moist, thick yellow/green drainage []       Wound Appearance: Redness []                  Drainage []                  Culture obtained []        Color: N/A     Consistency: NA     Amount: none         Gloves used, wound cleansed with sterile 4x4 and peroxide [x]       MD notified [] MD orders:   Antibiotic started []  If checked, type   Other:     Appointment for follow-up scheduled for 3 months post procedure [x]    Future Appointments  Date Time Provider Department Center   2018 1:30 PM Jakub Cook APRN [facility]   2018 11:00 AM Faisal Foote DO [facility] None           Gisele Bond MA, 18      MD Signature:______________________________ Completed By/Date:

## 2023-05-12 NOTE — PLAN OF CARE
Possibly due to hydrochlorothiazide, bactrim  · Due to uptrending levels will have renal evaluate him  · Urinary retention protocol  · US kidney and bladder  · 5/12-appreciate nephrology recommendations, continue with Bactrim although elevated creatinine likely secondary to side effects of Bactrim antibiotics  Have started sodium bicarb tablets, creatinine trending down  Continue monitoring urine output      Recent Labs     05/10/23  0458 05/11/23  0438 05/12/23  0504   BUN 32* 36* 36*   CREATININE 1 62* 1 71* 1 55*   EGFR 42 39 45       Results from last 7 days   Lab Units 05/11/23  1423 05/09/23  1630   BLOOD UA  Large*  --    PROTEIN UA mg/dl 70 (1+)*  --    SODIUM UR   --  65  86 Problem: Patient Care Overview  Goal: Plan of Care Review  Outcome: Ongoing (interventions implemented as appropriate)   06/14/18 0446   Coping/Psychosocial   Plan of Care Reviewed With patient   Plan of Care Review   Progress no change   OTHER   Outcome Summary VSS. pt still in Afib. received 4th dose of Tikosyn this evening. no c/o pain or discomfort. NPO for possible ECV. will continue to monitor.        Problem: Arrhythmia/Dysrhythmia (Symptomatic) (Adult)  Goal: Signs and Symptoms of Listed Potential Problems Will be Absent, Minimized or Managed (Arrhythmia/Dysrhythmia)  Outcome: Ongoing (interventions implemented as appropriate)   06/14/18 0446   Goal/Outcome Evaluation   Problems Assessed (Arrhythmia/Dysrhythmia) all   Problems Present (Dysrhythmia) electrophysiologic conduction defect

## 2024-11-25 NOTE — PROGRESS NOTES
Case Management Discharge Note    Final Note: GRICELDA spoke with [employer] Gernoimo Rep at [phone] regarding pt's copays. Faxed a benefits investigation form to [employer] that states his copay is 20-35% for medication filled at a retail pharmacy until he reaches his out of pocket max of $5,000. Once the out of pocket is reached, pt will pay 5% cost. If pt uses [facility] mail order for Xarelto for 90 day script his cost will be 15%. 30 day supply provided per [facility] office. Pt states he will call and discuss with [facility] and [facility] office if he decides he wants to receive Xarelto via mail order. Pt's Tikosyn will be provided per [facility] for this first months fill and he will transfer to his [facility] in [place].     Destination     No service coordination in this encounter.      Durable Medical Equipment     No service coordination in this encounter.      Dialysis/Infusion     No service coordination in this encounter.      Home Medical Care     No service coordination in this encounter.      Social Care     No service coordination in this encounter.             Final Discharge Disposition Code: 01 - home or self-care   Goal Outcome Evaluation:     Patient confused this morning, only alert to self - MD notified. CT head obtained - negative. Not eating well - refused breakfast and lunch, ate a small portion of dinner. MRI brain ordered, paperwork done over the phone with spouse - faxed. Medicated per orders. Plan of care ongoing.

## (undated) DEVICE — IRRIGATOR BULB ASEPTO 60CC STRL

## (undated) DEVICE — INTRO TEAR AWAY/LVD W/SD PRT 7F 13CM

## (undated) DEVICE — PENCL E/S HNDSWCH ROCKRBTN HOLSTR 10FT

## (undated) DEVICE — HI-TORQUE WHISPER MS GUIDE WIRE .014 STRAIGHT TIP 3.0 CM X 190 CM: Brand: HI-TORQUE WHISPER

## (undated) DEVICE — LEX ELECTRO PHYSIOLOGY: Brand: MEDLINE INDUSTRIES, INC.

## (undated) DEVICE — DRSNG SURESITE123 4X4.8IN

## (undated) DEVICE — Device

## (undated) DEVICE — ADULT, W/LG. BACK PAD, RADIOTRANSPARENT ELEMENT AND LEAD WIRE: Brand: DEFIBRILLATION ELECTRODES

## (undated) DEVICE — Device: Brand: EZ STEER

## (undated) DEVICE — DECANT BG O JET

## (undated) DEVICE — CAUTERY TIP POLISHER: Brand: DEVON

## (undated) DEVICE — DECANTER: Brand: UNBRANDED

## (undated) DEVICE — INTRO SHEATH ENGAGE W/50 GW .038 7F12

## (undated) DEVICE — TUBING, SUCTION, 1/4" X 10', STRAIGHT: Brand: MEDLINE

## (undated) DEVICE — SKIN AFFIX SURG ADHESIVE 72/CS 0.55ML: Brand: MEDLINE

## (undated) DEVICE — CANN NASL CO2 DIVIDED A/

## (undated) DEVICE — MEDI-VAC YANKAUER SUCTION HANDLE W/BULBOUS TIP: Brand: CARDINAL HEALTH

## (undated) DEVICE — LIMB HOLDER, WRIST/ANKLE: Brand: DEROYAL

## (undated) DEVICE — BALN PRESS WEDGE 6F 110CM

## (undated) DEVICE — CATH COURNARD DECCA CSL 6F120CM

## (undated) DEVICE — 3M™ STERI-STRIP™ REINFORCED ADHESIVE SKIN CLOSURES, R1547, 1/2 IN X 4 IN (12 MM X 100 MM), 6 STRIPS/ENVELOPE: Brand: 3M™ STERI-STRIP™

## (undated) DEVICE — ST INF PRI SMRTSTE 20DRP 2VLV 24ML 117

## (undated) DEVICE — SET PRIMARY GRVTY 10DP MALE LL 104IN

## (undated) DEVICE — INTRO TEAR AWAY/LVD W/SD PRT 10F 13CM

## (undated) DEVICE — ST EXT IV SMARTSITE 2VLV SP M LL 5ML IV1

## (undated) DEVICE — GUIDE CATHETER: Brand: ACUITY® PRO

## (undated) DEVICE — SOL NACL 0.9PCT 1000ML